# Patient Record
Sex: MALE | Race: BLACK OR AFRICAN AMERICAN | Employment: UNEMPLOYED | URBAN - METROPOLITAN AREA
[De-identification: names, ages, dates, MRNs, and addresses within clinical notes are randomized per-mention and may not be internally consistent; named-entity substitution may affect disease eponyms.]

---

## 2021-05-11 ENCOUNTER — HOSPITAL ENCOUNTER (EMERGENCY)
Age: 41
Discharge: HOME | End: 2021-05-11
Attending: EMERGENCY MEDICINE
Payer: COMMERCIAL

## 2021-05-11 VITALS
SYSTOLIC BLOOD PRESSURE: 158 MMHG | BODY MASS INDEX: 33.67 KG/M2 | HEART RATE: 85 BPM | TEMPERATURE: 98.6 F | RESPIRATION RATE: 14 BRPM | WEIGHT: 262.35 LBS | DIASTOLIC BLOOD PRESSURE: 92 MMHG | OXYGEN SATURATION: 96 % | HEIGHT: 74 IN

## 2021-05-11 DIAGNOSIS — B37.0 CANDIDIASIS OF MOUTH: ICD-10-CM

## 2021-05-11 DIAGNOSIS — K14.0 GLOSSITIS: Primary | ICD-10-CM

## 2021-05-11 PROCEDURE — 99283 EMERGENCY DEPT VISIT LOW MDM: CPT

## 2021-05-11 ASSESSMENT — ENCOUNTER SYMPTOMS
WHEEZING: 0
EYE PAIN: 0
COUGH: 0
EYE DISCHARGE: 0
DIARRHEA: 0
BACK PAIN: 0
NAUSEA: 0
EYE REDNESS: 0
SORE THROAT: 0
ABDOMINAL PAIN: 0
RHINORRHEA: 0
VOMITING: 0
SHORTNESS OF BREATH: 0

## 2021-05-11 NOTE — ED PROVIDER NOTES
organization: None     Attends meetings of clubs or organizations: None     Relationship status: None    Intimate partner violence     Fear of current or ex partner: None     Emotionally abused: None     Physically abused: None     Forced sexual activity: None   Other Topics Concern    None   Social History Narrative    None       SCREENINGS             PHYSICAL EXAM    (up to 7 for level 4, 8 or more for level 5)     ED Triage Vitals [05/11/21 0817]   BP Temp Temp Source Pulse Resp SpO2 Height Weight   (!) 158/92 98.6 °F (37 °C) Oral 85 14 96 % 6' 2\" (1.88 m) 262 lb 5.6 oz (119 kg)      height is 6' 2\" (1.88 m) and weight is 262 lb 5.6 oz (119 kg). His oral temperature is 98.6 °F (37 °C). His blood pressure is 158/92 (abnormal) and his pulse is 85. His respiration is 14 and oxygen saturation is 96%. Physical Exam  Vitals signs and nursing note reviewed. Constitutional:       Appearance: He is well-developed. He is not diaphoretic. HENT:      Head: Normocephalic and atraumatic. Right Ear: External ear normal.      Left Ear: External ear normal.      Mouth/Throat:      Lips: Pink. No lesions. Mouth: Mucous membranes are moist. No injury or oral lesions. Tongue: Lesions present. Tongue does not deviate from midline. Comments: Patient does have some streaky whitish exudate along the lateral aspects of the tongue. Some of the posterior papillae are a little bit hypertrophied. No masses palpated. No trismus. No lesions on the buccal mucosa that I can appreciate. Eyes:      General: No scleral icterus. Right eye: No discharge. Left eye: No discharge. Conjunctiva/sclera: Conjunctivae normal.   Neck:      Musculoskeletal: Normal range of motion. Trachea: No tracheal deviation. Pulmonary:      Effort: Pulmonary effort is normal. No respiratory distress. Breath sounds: No stridor. Musculoskeletal: Normal range of motion.    Skin:     General: Skin is warm and dry. Neurological:      Mental Status: He is alert and oriented to person, place, and time. Coordination: Coordination normal.   Psychiatric:         Behavior: Behavior normal.             DIAGNOSTIC RESULTS   LABS:    No results found for this visit on 05/11/21. All other labs were within normal range or not returned as of this dictation. EKG: All EKG's are interpreted by the Emergency Department Physician who either signs orCo-signs this chart in the absence of a cardiologist.    None    RADIOLOGY:   Non-plain film images such as CT, Ultrasound and MRI are read by the radiologist. Plain radiographic images are visualized and preliminarily interpreted by the  EDProvider with the below findings:    None        PROCEDURES   Unless otherwise noted below, none     Procedures    CRITICAL CARE TIME   N/A    CONSULTS:  None    EMERGENCY DEPARTMENT COURSE and DIFFERENTIAL DIAGNOSIS/MDM:   Vitals:    Vitals:    05/11/21 0817   BP: (!) 158/92   Pulse: 85   Resp: 14   Temp: 98.6 °F (37 °C)   TempSrc: Oral   SpO2: 96%   Weight: 262 lb 5.6 oz (119 kg)   Height: 6' 2\" (1.88 m)       Patient was given the following medications:  Medications - No data to display    Likely just some glossitis secondary to candidiasis. Will empirically treat with nystatin. If he does not improve I am going to refer him to ear nose and throat. FINAL IMPRESSION      1. Glossitis    2.  Candidiasis of mouth          DISPOSITION/PLAN    DISPOSITION Decision To Discharge 05/11/2021 08:28:47 AM      PATIENT REFERRED TO:  [address] [place] [address]  [address] [place] [address]  [phone]      This is our ear nose and throat specialist      DISCHARGE MEDICATIONS:  New Prescriptions    NYSTATIN (MYCOSTATIN) 164372 UNIT/ML SUSPENSION    Take 5 mLs by mouth 4 times daily for 24 days       DISCONTINUED MEDICATIONS:  Discontinued Medications    No medications on file              (Please note that portions of this note were completed with a voice recognition program.  Efforts were made to editthe dictations but occasionally words are mis-transcribed.)    Robson Webb MD (electronically signed)            Robson Webb MD  05/11/21 6428

## 2021-05-11 NOTE — ED NOTES
Awake alert  resp easy and unlabored  Skin warm and dry Aware how and why to take meds  To follow up with pmd  To have bp rechecked     Fern Howell RN  05/11/21 8811

## 2021-08-25 ENCOUNTER — OFFICE VISIT (OUTPATIENT)
Dept: ENT CLINIC | Age: 41
End: 2021-08-25
Payer: OTHER GOVERNMENT

## 2021-08-25 VITALS — BODY MASS INDEX: 33 KG/M2 | SYSTOLIC BLOOD PRESSURE: 122 MMHG | DIASTOLIC BLOOD PRESSURE: 82 MMHG | WEIGHT: 257 LBS

## 2021-08-25 DIAGNOSIS — H91.93 BILATERAL HEARING LOSS, UNSPECIFIED HEARING LOSS TYPE: ICD-10-CM

## 2021-08-25 DIAGNOSIS — K14.8 TONGUE LESION: ICD-10-CM

## 2021-08-25 DIAGNOSIS — H93.8X3 SENSATION OF FULLNESS IN BOTH EARS: Primary | ICD-10-CM

## 2021-08-25 DIAGNOSIS — D10.5 PAPILLOMA OF OROPHARYNX: ICD-10-CM

## 2021-08-25 DIAGNOSIS — H61.23 BILATERAL IMPACTED CERUMEN: ICD-10-CM

## 2021-08-25 PROCEDURE — G8417 CALC BMI ABV UP PARAM F/U: HCPCS | Performed by: STUDENT IN AN ORGANIZED HEALTH CARE EDUCATION/TRAINING PROGRAM

## 2021-08-25 PROCEDURE — 99203 OFFICE O/P NEW LOW 30 MIN: CPT | Performed by: STUDENT IN AN ORGANIZED HEALTH CARE EDUCATION/TRAINING PROGRAM

## 2021-08-25 PROCEDURE — G8427 DOCREV CUR MEDS BY ELIG CLIN: HCPCS | Performed by: STUDENT IN AN ORGANIZED HEALTH CARE EDUCATION/TRAINING PROGRAM

## 2021-08-25 PROCEDURE — 69210 REMOVE IMPACTED EAR WAX UNI: CPT | Performed by: STUDENT IN AN ORGANIZED HEALTH CARE EDUCATION/TRAINING PROGRAM

## 2021-08-25 NOTE — PROGRESS NOTES
EXAM    GENERAL: No acute distress, alert and oriented, no hoarseness, strong voice  EYES: EOMI, Anti-icteric  HENT:   Head: Normocephalic and atraumatic. Face:  Symmetric, facial nerve intact, no sinus tenderness  Right Ear: Normal external ear, normal external auditory canal, intact tympanic membrane with normal mobility and aerated middle ear  Left Ear: Normal external ear, normal external auditory canal, intact tympanic membrane with normal mobility and aerated middle ear  Mouth/Oral Cavity:  normal lips, Uvula is midline, no mucosal lesions, no trismus, fair dentition, normal salivary quality/flow  Oropharynx/Larynx:  normal oropharynx, 1+ tonsils; patient did not tolerate mirror exam due to excessive gag reflex  Nose:Normal external nasal appearance. Anterior rhinoscopy shows  a deviated septum preventing view posteriorly. Hypertrophy of turbinates. Normal mucosa   NECK: Normal range of motion, no thyromegaly, trachea is midline, no lymphadenopathy, no neck masses, no crepitus  CHEST: Normal respiratory effort, no retractions, breathing comfortably  SKIN: No rashes, normal appearing skin, no evidence of skin lesions/tumors  Neuro:  cranial nerve II-XII intact; normal gait  Cardio:  no edema    There is a small 5 mm papilloma lateral to the uvula on the right side. There is no underlying ulceration or mass. PROCEDURE    Use of Operating Microscope and Cerumen Removal CPT code 48246-46  Indications:  Bilateral cerumen impaction obstructing visualization of the tympanic membrane(s). An operating microscope was utilized to visualize the external auditory canals using a speculum. The external auditory canals were occluded with cerumen bilaterally. The cerumen and debris was removed with instrumentation including suction and currettes under microscopic evaluation. The bilateral tympanic membrane(s) and ossicles are intact. No fluid was visualized in the bilateral middle ears.               This note was generated completely or in part utilizing Dragon dictation speech recognition software. Occasionally, words are mistranscribed and despite editing, the text may contain inaccuracies due to incorrect word recognition. If further clarification is needed please contact the office at [phone]. An electronic signature was used to authenticate this note.     --Yue Stockton MD

## 2022-02-08 ENCOUNTER — HOSPITAL ENCOUNTER (OUTPATIENT)
Dept: PHYSICAL THERAPY | Age: 42
Setting detail: THERAPIES SERIES
Discharge: HOME | End: 2022-02-08
Payer: OTHER GOVERNMENT

## 2022-02-08 PROCEDURE — 97162 PT EVAL MOD COMPLEX 30 MIN: CPT

## 2022-02-08 PROCEDURE — 97530 THERAPEUTIC ACTIVITIES: CPT

## 2022-02-08 NOTE — PROGRESS NOTES
Physical Therapy      [address] [place] [address] and Sports Rehabilitation, [facility]  [address] [facility]  Phone: [phone]   Fax:     [phone]    Please sign and fax to [phone]    Physical Therapy Prescription    Date: 2022    Patient Name: Shay Reyes  : 1980  MRN: [id number]    Diagnosis:Diagnosis: M54.2 (ICD-10-CM) - Neck pain  Treatment Diagnosis: Treatment Diagnosis: Decreased functional mobility 2/2 neck pain. Referring Physician: Referring Practitioner  Toni Dickinson MD    Drug Allergies:  NA    With your approval we would like to add the following to the patient's current PT treatment:    []  Knee high compression stockings 20-30 mm Hg for bilateral swelling      Electronically signed by:  Bren Caal, PT   1217    If you have any questions or concerns, please don't hesitate to call.   Thank you for your referral.    Physician Signature:________________________________Date:__________________  By signing above, therapists plan is approved by physician

## 2022-02-08 NOTE — PLAN OF CARE
[facility] Outpatient [facility] and Therapy,  [facility]  [address] [facility], [facility] [place]  Phone: [phone]   Fax:     [phone]          Physical Therapy Certification    Dear Referring Practitioner: Nichole Tolbert,    We had the pleasure of evaluating the following patient for physical therapy services at [facility] and Therapy. A summary of our findings can be found in the initial assessment below. This includes our plan of care. If you have any questions or concerns regarding these findings, please do not hesitate to contact me at the office phone number checked above. Thank you for the referral.       Physician Signature:_______________________________Date:__________________  By signing above (or electronic signature), therapists plan is approved by physician            Patient: Mary Bhatia   : 1980   MRN: [id number]     Referring Physician: Referring Practitioner: Nichole Tolbert      Evaluation Date: 2022        Medical Diagnosis Information:  Diagnosis: M54.2 (ICD-10-CM) - Neck pain   Treatment Diagnosis: Decreased functional mobility 2/2 neck pain.                                          Insurance information: PT Insurance Information: [facility]    Precautions/ Contra-indications: none noted  Latex Allergy:  [x]NO      []YES  Preferred Language for Healthcare:   [x]English       []other:    C-SSRS Triggered by Intake questionnaire (Past 2 wk assessment ):   [x] No, Questionnaire did not trigger screening.   [] Yes, Patient intake triggered C-SSRS Screening      [] C-SSRS Screening completed  [] PCP notified via Epic          History of Present Condition   21     POSTERIOR CERVICAL LAMINIOPLASTY C4-5-6-7 ; ANTERIOR CERVICAL DISCECTOMY FUSION C4-5, C5-6 , C6-7 ASPIRATE BONE MARROW RIGHT ILIAC CREST AND C6 CORPECTOMY            MD note 22  The patient is now approximately six to seven months post anterior-posterior fusion. His sequential scans show marked improvement in the cervical stenosis, cord compression and swelling. The swelling in his cord is improving. His subarachnoid space is now circumferentially present. I believe that he has had a good decompression of his cord. However, his neurological symptoms in his upper and lower extremities persists and are simply incapacitating. He has diffuse weakness in his arms.                 His reflexes are hyperactive. His toes are down going. He has diffuse weakness in his upper and lower extremities.            SUBJECTIVE: Pt reports weakness in his arms at times with NT, also bilateral hands. Notes weakness in both legs R>L    Relevant Medical History:Additional Pertinent Hx: HTN, back surgery 2014/2015  Functional Disability Index:  NDI   21    Pain Scale: 7-9 /10  Tends to be bad in the morning, better when it loosens up. Easing factors:  Rest.  Provocative factors:  Initial AM/ too much activity    Type: []Constant   []Intermittent  []Radiating []Localized []other:     Numbness/Tingling: through UE's    Occupation/School:  Currently unable to work    Living Status/Prior Level of Function: lives with family. [address] home with railing. Indep with self care. Difficulty putting on pants on right leg, difficulty with sock and shoe on right side as well. Difficulty descending steps. Sleeps in recliner 2/2 neck and back issues. OBJECTIVE:   Palpation: no specific TTP noted. Has mild pitting edema moreso on right than left. Functional Mobility/Transfers: Mod I - needs arms of chair for leverage    Posture:  Fwd head, slightly slouched posture    Bandages/Dressings/Incisions: NA    Gait: (include devices/WB status):  WNL    CERV ROM     Cervical Flexion Chin to chest    Cervical Extension neutral     Left Right   Cervical SB     Cervical rotation     Quadrant     Dorsal Glide      UE ROM Left Right   Shoulder Flex wfl wfl   Shoulder Abd wfl wfl   Shoulder ER wfl wfl   Shoulder IR wfl wfl   Elbow flex/ext wfl wfl   Wrist flex/ext/pro/sup wfl wfl   Finger flex/ext/opposition wfl wfl   Shoulder AROM WNL w OP     UE Strength  Left Right   Shoulder Flex Grossly 4-/5 throughout Grossly 4-/5throughout   Shoulder Scap     Shoulder ABd (C5 Axillary)     Shoulder ER      Shoulder IR     Elbow Flex (C5 Musc)     Elbow Ext (C7 Radial)     Wrist Flex (C6 Radial)     Wrist Ext (C7 Radial)     Finger flex (C8 median)     Finger ext (C7 Radial-PIN)     APB (T1 Median)     Finger Abd (T1 Ulnar)     UE myotomes WNL            LE ROM -  WFL mario LE's, increased effort to initiate movement RLE  LE strength - grossly 4/5 left,  4-/5 right. Balance -  Feet together - wfl ,  Modified tandem min sway. Tandem  R/L increased sway with R/L  - unable to SLS      Reflexes Normal Abnormal Comments               S1-2 Seated achilles [] [x] hyper   S1-2 Prone knee bend [] []    L3-4 Patellar tendon [] [x] hyper   C5-6 Biceps [] [x] hyper   C6 Brachioradialis [] []    C7-8 Triceps [] [x] hyper     Reflexes/Sensation:    [x]Dermatomes/Myotomes intact    []Reflexes equal and normal bilaterally   []Other:    Joint mobility: NT   []Normal    []Hypo   []Hyper    Neurodynamics:     Orthopedic Special Tests:      Cluster Testing  Normal Abnormal N/A Comments   Babinski Test [] [] []    Clinton Test [] [] []    Inverted Sup Sign [] [] []    Alar Ligament Test [] [] []    Transverse Ligament Test [] [] []    Sharp-Lakeshia Test [] [] []    Hautards Test [] [] []    Vertebral Artery Test [] [] []                                    [x] Patient history, allergies, meds reviewed. Medical chart reviewed. See intake form. Review Of Systems (ROS):  [x]Performed Review of systems (Integumentary, CardioPulmonary, Neurological) by intake and observation. Intake form has been scanned into medical record.  Patient has been instructed to contact their primary care physician regarding ROS issues if not already being addressed at this time. Co-morbidities/Complexities (which will affect course of rehabilitation):  []None     2       Arthritic conditions   []Rheumatoid arthritis (M05.9)  []Osteoarthritis (M19.91)   Cardiovascular conditions   [x]Hypertension (I10)  []Hyperlipidemia (E78.5)  []Angina pectoris (I20)  []Atherosclerosis (I70)  []CVA Musculoskeletal conditions   []Disc pathology   []Congenital spine pathologies   [x]Prior surgical intervention  []Osteoporosis (M81.8)  []Osteopenia (M85.8)   Endocrine conditions   []Hypothyroid (E03.9)  []Hyperthyroid Gastrointestinal conditions   []Constipation (U49.01)   Metabolic conditions   []Morbid obesity (E66.01)  []Diabetes type 1(E10.65) or 2 (E11.65)   []Neuropathy (G60.9)     Pulmonary conditions   []Asthma (J45)  []Coughing   []COPD (J44.9)   Psychological Disorders  []Anxiety (F41.9)  []Depression (F32.9)   []Other:   []Other:          Barriers to/and or personal factors that will affect rehab potential:              []Age  []Sex   []Smoker              []Motivation/Lack of Motivation                        []Co-Morbidities              []Cognitive Function, education/learning barriers              []Environmental, home barriers              []profession/work barriers  []past PT/medical experience  []other:  Justification:     Falls Risk Assessment (30 days):   [x] Falls Risk assessed and no intervention required. [] Falls Risk assessed and Patient requires intervention due to being higher risk   TUG score (>12s at risk):     [] Falls education provided, including       ASSESSMENT Pt with history of UE/LE weakness 2/2 cord compression. Will benefit from aquatic therapy for strengthening, balance and functional mobility.       Functional Impairments:     []Noted cervical/thoracic/GHJ joint hypomobility   []Noted cervical/thoracic/GHJ joint hypermobility   []Decreased cervical/UE functional ROM   []Noted Headache pain aggravated by neck movements with/without dizziness   []Abnormal reflexes/sensation/myotomal/dermatomal deficits   []Decreased DCF control or ability to hold head up   []Decreased RC/scapular/core strength and neuromuscular control    []Decreased UE functional strength   []other:      Functional Activity Limitations (from functional questionnaire and intake)   []Reduced ability to tolerate prolonged functional positions   [x]Reduced ability or difficulty with changes of positions or transfers between positions   [x]Reduced ability to maintain good posture and demonstrate good body mechanics with sitting, bending, and lifting   [] Reduced ability or tolerance with driving and/or computer work   [x]Reduced ability to perform lifting, reaching, carrying tasks   []Reduced ability to concentrate   []Reduced ability to sleep    [x]Reduced ability to tolerate any impact through UE or spine   [x]Reduced ability to ambulate prolonged functional periods/distances   []other:    Participation Restrictions   []Reduced participation in self care activities   [x]Reduced participation in home management activities   [x]Reduced participation in work activities   [x]Reduced participation in social activities. [x]Reduced participation in sport/recreational activities.     Classification/Subgrouping:   []signs/symptoms consistent with neck pain with mobility deficits     []signs/symptoms consistent with neck pain with movement coordinated impairments    []signs/symptoms consistent with neck pain with radiating pain    []signs/symptoms consistent with neck pain with headaches (cervicogenic)    []Signs/symptoms consistent with nerve root involvement including myotome & dermatome dysfunction   []sign/symptoms consistent with facet dysfunction of cervical and thoracic spine    []signs/symptoms consistent suggesting central cord compression/UMN syndromes   []signs/symptoms consistent with discogenic cervical pain   []signs/symptoms consistent with rib dysfunction   []signs/symptoms consistent with postural dysfunction   []signs/symptoms consistent with shoulder pathology    [x]signs/symptoms consistent with post-surgical status including decreased ROM, strength and function. []signs/symptoms consistent with pathology which may benefit from Dry Needling   []signs/symptoms which may limit the use of advanced manual therapy techniques: (Elevated CV risk profile, recent trauma, intolerance to end range positions, prior TIA, visual issues, UE neurological compromise )     Prognosis/Rehab Potential:      []Excellent   [x]Good    []Fair   []Poor    Tolerance of evaluation/treatment:    []Excellent   [x]Good    []Fair   []Poor    Physical Therapy Evaluation Complexity Justification  [x] A history of present problem with:  [] no personal factors and/or comorbidities that impact the plan of care;  [x]1-2 personal factors and/or comorbidities that impact the plan of care  []3 personal factors and/or comorbidities that impact the plan of care  [x] An examination of body systems using standardized tests and measures addressing any of the following: body structures and functions (impairments), activity limitations, and/or participation restrictions;:  [] a total of 1-2 or more elements   [x] a total of 3 or more elements   [] a total of 4 or more elements   [x] A clinical presentation with:  [x] stable and/or uncomplicated characteristics   [] evolving clinical presentation with changing characteristics  [] unstable and unpredictable characteristics;   [x] Clinical decision making of [] low, [x] moderate, [] high complexity using standardized patient assessment instrument and/or measurable assessment of functional outcome.     [] EVAL (LOW) 71369 (typically 20 minutes face-to-face)  [x] EVAL (MOD) 15911 (typically 30 minutes face-to-face)  [] EVAL (HIGH) 62728 (typically 45 minutes face-to-face)  [] RE-EVAL     PLAN:   Frequency/Duration:  2 days per week for 12 Weeks:  Interventions:  [x]  Therapeutic exercise including: strength training, ROM, for cervical spine,scapula, core and Upper extremity, including postural re-education. [x]  NMR activation and proprioception for Deep cervical flexors, periscapular and RC muscles and Core, including postural re-education. [x]  Manual therapy as indicated for C/T spine, ribs, Soft tissue to include: Dry Needling/IASTM, STM, PROM, Gr I-IV mobilizations, manipulation. [x] Modalities as needed that may include: thermal agents, E-stim, Biofeedback, US, iontophoresis as indicated  [x] Patient education on joint protection, postural re-education, activity modification, progression of HEP. [x] Aquatic exercise including: strength training, ROM, for cervical spine,scapula, core and Upper extremity, including postural re-education. GOALS:  Patient stated goal: TO be able to move better and not have as much pain  [] Progressing: [] Met: [] Not Met: [] Adjusted    Therapist goals for Patient:   Short Term Goals: To be achieved in: 2 weeks  1. Independent in HEP and progression per patient tolerance, in order to prevent re-injury. [] Progressing: [] Met: [] Not Met: [] Adjusted  2. Patient will have a decrease in pain to facilitate improvement in movement, function, and ADLs as indicated by Functional Deficits. [] Progressing: [] Met: [] Not Met: [] Adjusted    Long Term Goals: To be achieved in: 12 weeks  1. Disability index score of 30% or less for the NDI to assist with reaching prior level of function. [] Progressing: [] Met: [] Not Met: [] Adjusted  2. Patient will demonstrate increased AROM to [facility] of cervical/thoracic spine to allow for proper joint functioning as indicated by patients Functional Deficits. [] Progressing: [] Met: [] Not Met: [] Adjusted  3.  Patient will demonstrate an increase in postural awareness and control and activation of  Deep cervical stabilizers to allow for proper functional mobility as indicated by patients Functional Deficits. [] Progressing: [] Met: [] Not Met: [] Adjusted  4. Patient will return to 70% functional activities without increased symptoms or restriction. [] Progressing: [] Met: [] Not Met: [] Adjusted  5. To be able to walk better with better balance. [] Progressing: [] Met: [] Not Met: [] Adjusted         Electronically signed by:  Juan M Zimmerman, PT [id number]      Note: If patient does not return for scheduled/recommended follow up visits, this note will serve as a discharge from care along with the most recent update on progress.

## 2022-02-08 NOTE — FLOWSHEET NOTE
[address], [place]  [address] [facility]  Phone: [phone]   Fax:     [phone]      Physical Therapy Daily/Aquatic Flow Sheet   Date:  2022    Patient Name:  Chilo Marcelo    :  1980  MRN: [id number]    Restrictions/Precautions:   None noted  Pertinent Medical History:Additional Pertinent Hx: HTN, back surgery     Medical/Treatment Diagnosis Information:   · Diagnosis: M54.2 (ICD-10-CM) - Neck pain  · Treatment Diagnosis: Decreased functional mobility 2/2 neck pain. Insurance/Certification information:  PT Insurance Information: Kunal  Physician Information:  Referring Practitioner: Poncho Orellana of [facility] signed (Y/N): sent    Date of Patient follow up with Physician:      Progress Report: []  Yes  [x]  No     Date Range for reporting period:  Beginnin2022  Ending:      Progress report due (10 Rx/or 30 days whichever is less): 3/8     Recertification due (POC duration/ or 90 days whichever is less):      Visit # POC/Insurance Allowable Auth Needed   1 30 []Yes   [x]No     Latex Allergy:  [x]NO      []YES  Preferred Language for Healthcare:   [x]English       []Other:    History of Present Condition   21     POSTERIOR CERVICAL LAMINIOPLASTY C4-5-6-7 ; ANTERIOR CERVICAL DISCECTOMY FUSION C4-5, C5-6 , C6-7 ASPIRATE BONE MARROW RIGHT ILIAC CREST AND C6 CORPECTOMY            MD note 22  The patient is now approximately six to seven months post anterior-posterior fusion. His sequential scans show marked improvement in the cervical stenosis, cord compression and swelling. The swelling in his cord is improving. His subarachnoid space is now circumferentially present.           SUBJECTIVE: Pt reports weakness in his arms at times with NT, also bilateral hands.   Notes weakness in both legs R>L     Relevant Medical History:Additional Pertinent Hx: HTN, back surgery   Functional Disability Index: NDI   21     Pain Scale: 7-9 /10  Tends to be bad in the morning, better when it loosens up. Easing factors:  Rest.  Provocative factors:  Initial AM/ too much activity     Type: [x]? Constant       []? Intermittent  []? Radiating     []? Localized     []? other:                Numbness/Tingling: through UE's     Occupation/School:  Currently unable to work     Living Status/Prior Level of Function: lives with family. [address] home with railing. Indep with self care. Difficulty putting on pants on right leg, difficulty with sock and shoe on right side as well. Difficulty descending steps. Sleeps in recliner 2/2 neck and back issues.      SUBJECTIVE: Pt reports weakness in his arms at times with NT, also bilateral hands. Notes weakness in both legs R>L      OBJECTIVE:   Palpation: no specific TTP noted. Has mild pitting edema moreso on right than left.     Functional Mobility/Transfers: Mod I - needs arms of chair for leverage    UE -  ROM  WFL  Strength  Grossly 4-/5 throughout     LE ROM -  WFL mario LE's, increased effort to initiate movement RLE  LE strength - grossly 4/5 left,  4-/5 right.     Balance -  Feet together - wfl ,  Modified tandem min sway.   Tandem  R/L increased sway with R/L  - unable         Land-based Visits Exercises/Activities:  Therapeutic Ex (04359)  Min: Resistance/Repetitions Notes   Tandem stance               Therapeutic Activity (97963) Min: 25     Pool tour, policies and procedures Pt demonstrates understanding    Review of home set up/modifications     Review of LE swelling and options to manage    REview of gait issues with balance and options to manage     NMR re-education (65977) Min:                          Manual Intervention (07095)  Min:                    Modalities  Min:               Aquatic Visits Exercises/Activities:  Aquatic Abbreviation Key  B= Belt DB= Dumbells T= Theratube   G=Gloves N= Noodles W= Weights   P= Paddles WW=Water Walker K= Kickboard        Transfers:         % Immersion:             Ambulation:   Exercises UE:      Forwards  Shoulder Shrugs     Lateral  Shoulder circles     Marching    Scapular Retraction      Retro   Rolling      Cariocas  Push Downs    Multifidus walkouts w/paddle  IR/ER      Punching    Stretching:  Rowing    Gastroc/Soleus   Elbow Flex/Ext    Hamstring   Shldr Flex/Ext     Knee flex stretch   Shldr Abd/Add    Piriformis   Horiz Abd/Add     Hip flexor    Arm Circles     SKTC   PNF Diagonals    DKTC  UE oscillations f/b, s/s    ITB   Wall Push-ups    Quad  Figure 8's    Mid back   Buoy pull/push downs    UT  Tband rows    Rhom  Tband lats    Post Shoulder  Lumbar Rot w/ paddles    Pec   Small ball pull downs                   diagonals             Cervical:       AROM Flex       AROM Extension     LEs exercises:   AROM Side Bending    Marching   AROM Rotation    Heel Raises   Chin tucks    Toe Raises   Chin nods     Squats        Hamstring Curls        Hip Flexion   Balance:      Hip Abduction  SLS    Hip Circles  Tandem stance    TA set  NBOS eyes open    Glut Set  NBOS eyes closed    Hip Extension  Hand to Opposite Knee    Hip Adduction    Box Step     Hip IR   Noodle Stance     Hip ER  Stop/Go Gait    Fig 8's  Switch Gait                Seated:  Functional:    Ankle Pumps  Step up forward    Ankle circles  Step up lateral    Knee flex & ext  Step down    Hip Abd & Adduction  [place] squats    Bicycle   Crate Lifts    Add Set with ball  Lunges forward    LX stab with med ball throws  Lunges lateral    Ankle INV  Lunges retro    Ankle EV  Lower ab curl with noodle      Upper ab curl with ball      Med ball straight lifts      Med ball diagonal lifts      Hydrorider          Noodle:      Leg Press  Deep Water:    Noodle hang at wall  Jog    Noodle hang deep water  Jumping Jacks    Noodle Bicycle  Heel to toe      Hand to opposite knee      Cross country skier      Rocking Horse      Other Therapeutic Activities:  Pt was educated on PT POC, Diagnosis, Prognosis, pathomechanics as well as frequency and duration of scheduling future physical therapy appointments. Time was also taken on this day to answer all patient questions and participation in PT. Reviewed appointment policy in detail with patient and patient verbalized understanding. Home Exercise Program:  Patient was instructed in the following for HEP:     . Patient verbalized/demonstrated understanding and was issued written handout. Charges: Therapeutic Exercise and NMR EXR  [] (80162) Provided verbal/tactile cueing for activities related to strengthening, flexibility, endurance, ROM for improvements in LE, proximal hip, and core control with self care, mobility, lifting, ambulation.  [] (12836) Provided verbal/tactile cueing for activities related to improving balance, coordination, kinesthetic sense, posture, motor skill, proprioception  to assist with LE, proximal hip, and core control in self care, mobility, lifting, ambulation and eccentric single leg control.      NMR and Therapeutic Activities:    [] (23711 or 80595) Provided verbal/tactile cueing for activities related to improving balance, coordination, kinesthetic sense, posture, motor skill, proprioception and motor activation to allow for proper function of core, proximal hip and LE with self care and ADLs and functional mobility.   [] (24491) Gait Re-education- Provided training and instruction to the patient for proper LE, core and proximal hip recruitment and positioning and eccentric body weight control with ambulation re-education including up and down stairs     Home Exercise Program:    [x] (22916) Reviewed/Progressed HEP activities related to strengthening, flexibility, endurance, ROM of core, proximal hip and LE for functional self-care, mobility, lifting and ambulation/stair navigation   [] (28149)Reviewed/Progressed HEP activities related to improving balance, coordination, kinesthetic sense, posture, motor skill, proprioception of core, proximal hip and LE for self care, mobility, lifting, and ambulation/stair navigation      Manual Treatments:  PROM / STM / Oscillations-Mobs:  G-I, II, III, IV (PA's, Inf., Post.)  [] (77721) Provided manual therapy to mobilize LE, proximal hip and/or LS spine soft tissue/joints for the purpose of modulating pain, promoting relaxation,  increasing ROM, reducing/eliminating soft tissue swelling/inflammation/restriction, improving soft tissue extensibility and allowing for proper ROM for normal function with self care, mobility, lifting and ambulation.      Individual Aquatic Therapy:  [] (73207) Provided verbal and tactile cueing for strengthening, flexibility, ROM using the therapeutic properties of water (buoyancy, resistance) for improvements in core control, mobility and ambulation     Aquatic Group:  [] (67599) Provided intermittent verbal and tactile cueing for strengthening, endurance, flexibility and ROM for 2-4 individuals that do not require one-on one patient contact by the therapist       Land Timed Code Treatment Minutes: 25   Land Total Treatment Minutes: 40     Individual Aquatic Minutes:    Aquatic Group Minutes:      [] EVAL (LOW) 40666 (typically 20 minutes face-to-face)  [x] EVAL (MOD) 02786 (typically 30 minutes face-to-face)  [] EVAL (HIGH) 69579 (typically 45 minutes face-to-face)  [] RE-EVAL     [] HX(56587) x     [] Dry needle 1 or 2 Muscles (38172)  [] NMR (88606) x     [] Dry needle 3+ Muscles (65970)  [] Manual (68821) x     [] Ultrasound (39673) x  [x] TA (78086) x    2 [] Mech Traction (87459)  [] ES(attended) (99175)     [] ES (un) (94936):   [] Vasopump (16114) [] Ionto (34582)   [] Aquatic Ind (15067) x    [] Aquatic Group (36463) x   [] Other:    Treatment/Activity Tolerance:  [x] Patient tolerated treatment well [] Patient limited by fatigue  [] Patient limited by pain  [] Patient limited by other medical complications  [] Other:     Prognosis: [] Good [] Fair  [] Poor     ASSESSMENT Pt with history of UE/LE weakness 2/2 cord compression. Will benefit from aquatic therapy       GOALS:  Patient stated goal: TO be able to move better and not have as much pain  []? Progressing: []? Met: []? Not Met: []? Adjusted     Therapist goals for Patient:   Short Term Goals: To be achieved in: 2 weeks  1. Independent in HEP and progression per patient tolerance, in order to prevent re-injury. []? Progressing: []? Met: []? Not Met: []? Adjusted  2. Patient will have a decrease in pain to facilitate improvement in movement, function, and ADLs as indicated by Functional Deficits. []? Progressing: []? Met: []? Not Met: []? Adjusted     Long Term Goals: To be achieved in: 12 weeks  1. Disability index score of 30% or less for the NDI to assist with reaching prior level of function. []? Progressing: []? Met: []? Not Met: []? Adjusted  2. Patient will demonstrate increased AROM to [facility] of cervical/thoracic spine to allow for proper joint functioning as indicated by patients Functional Deficits. []? Progressing: []? Met: []? Not Met: []? Adjusted  3. Patient will demonstrate an increase in postural awareness and control and activation of  Deep cervical stabilizers to allow for proper functional mobility as indicated by patients Functional Deficits. []? Progressing: []? Met: []? Not Met: []? Adjusted  4. Patient will return to 70% functional activities without increased symptoms or restriction. []? Progressing: []? Met: []? Not Met: []? Adjusted  5. To be able to walk better with better balance. []? Progressing: []? Met: []? Not Met: []?  Adjusted                 Patient Requires Follow-up: [x] Yes  [] No    Plan:   [x] Continue per plan of care [] Alter current plan (see comments)  [] Plan of care initiated [] Hold pending MD visit [] Discharge    Plan for Next Session:   Initiate aquatic therapy    Electronically signed by:  Niesha Joshua, PT [id number]    Note: If patient does not return for scheduled/recommended follow up visits, this note will serve as a discharge from care along with the most recent update on progress.

## 2022-02-10 ENCOUNTER — HOSPITAL ENCOUNTER (OUTPATIENT)
Dept: PHYSICAL THERAPY | Age: 42
Setting detail: THERAPIES SERIES
Discharge: HOME | End: 2022-02-10
Payer: OTHER GOVERNMENT

## 2022-02-10 PROCEDURE — 97113 AQUATIC THERAPY/EXERCISES: CPT

## 2022-02-10 NOTE — FLOWSHEET NOTE
[address] [facility],[address], [facility]  [address] [facility] [place], [facility]  Phone: [phone]   Fax:     [phone]      Physical Therapy Daily/Aquatic Flow Sheet   Date:  2/10/2022    Patient Name:  Haleigh Sandoval    :  1980  MRN: [id number]    Restrictions/Precautions:   None noted  Pertinent Medical History:Additional Pertinent Hx: HTN, back surgery     Medical/Treatment Diagnosis Information:   · Diagnosis: M54.2 (ICD-10-CM) - Neck pain  · Treatment Diagnosis: Decreased functional mobility 2/2 neck pain. Insurance/Certification information:  PT Insurance Information: Kunal  Physician Information:  Referring Practitioner: Jordan Gonzalez of care signed (Y/N): sent    Date of Patient follow up with Physician:      Progress Report: []  Yes  [x]  No     Date Range for reporting period:  Beginnin/10/2022  Ending:      Progress report due (10 Rx/or 30 days whichever is less): 3/8     Recertification due (POC duration/ or 90 days whichever is less):      Visit # POC/Insurance Allowable Auth Needed   2 30 []Yes   [x]No     Latex Allergy:  [x]NO      []YES  Preferred Language for Healthcare:   [x]English       []Other:    History of Present Condition   21     POSTERIOR CERVICAL LAMINIOPLASTY C4-5-6-7 ; ANTERIOR CERVICAL DISCECTOMY FUSION C4-5, C5-6 , C6-7 ASPIRATE BONE MARROW RIGHT ILIAC CREST AND C6 CORPECTOMY            MD note 22  The patient is now approximately six to seven months post anterior-posterior fusion. His sequential scans show marked improvement in the cervical stenosis, cord compression and swelling. The swelling in his cord is improving. His subarachnoid space is now circumferentially present.           SUBJECTIVE: Pt reports weakness in his arms at times with NT, also bilateral hands.   Notes weakness in both legs R>L  2/10: My neck pain is 4/10 today.     Relevant Medical History:Additional Pertinent Hx: HTN, back surgery 2014/2015  Functional Disability Index:  NDI   21     Pain Scale: 4/10 neck    Easing factors:  Rest.  Provocative factors:  Initial AM/ too much activity     Type: [x]? Constant       []? Intermittent  []? Radiating     []? Localized     []? other:                Numbness/Tingling: through UE's     Occupation/School:  Currently unable to work     Living Status/Prior Level of Function: lives with family. [address] home with railing. Indep with self care. Difficulty putting on pants on right leg, difficulty with sock and shoe on right side as well. Difficulty descending steps. Sleeps in recliner 2/2 neck and back issues.      SUBJECTIVE: Pt reports weakness in his arms at times with NT, also bilateral hands. Notes weakness in both legs R>L      OBJECTIVE:   Palpation: no specific TTP noted. Has mild pitting edema moreso on right than left.     Functional Mobility/Transfers: Mod I - needs arms of chair for leverage    UE -  ROM  WFL  Strength  Grossly 4-/5 throughout     LE ROM -  WFL mario LE's, increased effort to initiate movement RLE  LE strength - grossly 4/5 left,  4-/5 right.     Balance -  Feet together - wfl ,  Modified tandem min sway.   Tandem  R/L increased sway with R/L  - unable         Land-based Visits Exercises/Activities:  Therapeutic Ex (57231)  Min: Resistance/Repetitions Notes   Tandem stance               Therapeutic Activity (41611) Min: 25     Pool tour, policies and procedures Pt demonstrates understanding    Review of home set up/modifications     Review of LE swelling and options to manage    REview of gait issues with balance and options to manage     NMR re-education (37764) Min:                          Manual Intervention (29898)  Min:                    Modalities  Min:               Aquatic Visits Exercises/Activities:  Aquatic Abbreviation Key  B= Belt DB= Dumbells T= Theratube   G=Gloves N= Noodles W= Weights   P= Paddles WW=Water Walker K= Kickboard        Transfers:   steps with bilat handrails WRIST flex/ext, pron/sup 10/10    % Immersion: 75-80% FINGER flex/ext 10          Ambulation:   Exercises UE:      Forwards 3+1+1 with gentle UE mvt Shoulder Shrugs     Lateral  Shoulder circles 10    Marching    Scapular Retraction  10    Retro   Rolling      Cariocas  Push Downs 10   Multifidus walkouts w/paddle  IR/ER 10     Punching 10   Stretching:  Rowing 10   Gastroc/Soleus   Elbow Flex/Ext 10   Hamstring   Shldr Flex/Ext     Knee flex stretch   Shldr Abd/Add    Piriformis   Horiz Abd/Add     Hip flexor    Arm Circles  10   SKTC   PNF Diagonals    DKTC  UE oscillations f/b, s/s    ITB   Wall Push-ups    Quad  Figure 8's    Mid back   Buoy pull/push downs    UT  Tband rows    Rhom  Tband lats    Post Shoulder  Lumbar Rot w/ paddles    Pec   Small ball pull downs                   diagonals      Small ball pool to deck 10      Cervical:       AROM Flex 5      AROM Extension     LEs exercises:   AROM Side Bending    Marching  10 AROM Rotation 5/5 R/L   Heel Raises  10 Chin tucks    Toe Raises  10 Chin nods     Squats  10      Hamstring Curls  10      Hip Flexion  10 Balance:      Hip Abduction 10 SLS    Hip Circles  Tandem stance    TA set  NBOS eyes open 30 sec without LOB   Glut Set  NBOS eyes closed    Hip Extension  Hand to Opposite Knee    Hip Adduction    Box Step     Hip IR   Noodle Stance     Hip ER  Stop/Go Gait    Fig 8's  Switch Gait      Foot on step 30 sec R/L without LOB         Seated:  Functional:    Ankle Pumps  Step up forward    Ankle circles  Step up lateral    Knee flex & ext  Step down    Hip Abd & Adduction  [place] squats    Bicycle   Crate Lifts    Add Set with ball  Lunges forward    LX stab with med ball throws  Lunges lateral    Ankle INV  Lunges retro    Ankle EV  Lower ab curl with noodle      Upper ab curl with ball      Med ball straight lifts      Med ball diagonal lifts      Hydrorider          Noodle:      Leg Press  Deep Water:    Noodle hang at wall  Jog with ambulation re-education including up and down stairs     Home Exercise Program:    [x] (25744) Reviewed/Progressed HEP activities related to strengthening, flexibility, endurance, ROM of core, proximal hip and LE for functional self-care, mobility, lifting and ambulation/stair navigation   [] (13459)Reviewed/Progressed HEP activities related to improving balance, coordination, kinesthetic sense, posture, motor skill, proprioception of core, proximal hip and LE for self care, mobility, lifting, and ambulation/stair navigation      Manual Treatments:  PROM / STM / Oscillations-Mobs:  G-I, II, III, IV (PA's, Inf., Post.)  [] (67238) Provided manual therapy to mobilize LE, proximal hip and/or LS spine soft tissue/joints for the purpose of modulating pain, promoting relaxation,  increasing ROM, reducing/eliminating soft tissue swelling/inflammation/restriction, improving soft tissue extensibility and allowing for proper ROM for normal function with self care, mobility, lifting and ambulation.      Individual Aquatic Therapy:  [x] (34657) Provided verbal and tactile cueing for strengthening, flexibility, ROM using the therapeutic properties of water (buoyancy, resistance) for improvements in core control, mobility and ambulation     Aquatic Group:  [x] (53723) Provided intermittent verbal and tactile cueing for strengthening, endurance, flexibility and ROM for 2-4 individuals that do not require one-on one patient contact by the therapist       Land Timed Code Treatment Minutes: 25   Land Total Treatment Minutes: [facility] Minutes: 25   Aquatic Group Minutes: 10     [] EVAL (LOW) 74190 (typically 20 minutes face-to-face)  [x] EVAL (MOD) 74283 (typically 30 minutes face-to-face)  [] EVAL (HIGH) 99932 (typically 45 minutes face-to-face)  [] RE-EVAL     [] JM(06517) x     [] Dry needle 1 or 2 Muscles (90530)  [] NMR (60729) x     [] Dry needle 3+ Muscles (54092)  [] Manual (28647) x     [] Ultrasound (88014) x  [x] TA (38768) x    2 [] Mech Traction (17231)  [] ES(attended) (60754)     [] ES (un) (31873):   [] Vasopump (59565) [] Ionto (45578)   [x] Aquatic Ind (78049) x 2   [x] Aquatic Group (38865) x    [] Other:    Treatment/Activity Tolerance:  [x] Patient tolerated treatment well [] Patient limited by fatigue  [] Patient limited by pain  [] Patient limited by other medical complications  [] Other:     Prognosis: [] Good [] Fair  [] Poor     ASSESSMENT Pt with history of UE/LE weakness 2/2 cord compression. Will benefit from aquatic therapy   2/10: Pt completed exercises as instructed. Pt notes dec pain after aquatic therapy. Pt would benefit from skilled instruction for UE exer, cervical ROM, balance and posture awareness to dec pain, inc ROM and improve function. GOALS:  Patient stated goal: TO be able to move better and not have as much pain  []? Progressing: []? Met: []? Not Met: []? Adjusted     Therapist goals for Patient:   Short Term Goals: To be achieved in: 2 weeks  1. Independent in HEP and progression per patient tolerance, in order to prevent re-injury. []? Progressing: []? Met: []? Not Met: []? Adjusted  2. Patient will have a decrease in pain to facilitate improvement in movement, function, and ADLs as indicated by Functional Deficits. []? Progressing: []? Met: []? Not Met: []? Adjusted     Long Term Goals: To be achieved in: 12 weeks  1. Disability index score of 30% or less for the NDI to assist with reaching prior level of function. []? Progressing: []? Met: []? Not Met: []? Adjusted  2. Patient will demonstrate increased AROM to [facility] of cervical/thoracic spine to allow for proper joint functioning as indicated by patients Functional Deficits. []? Progressing: []? Met: []? Not Met: []? Adjusted  3.  Patient will demonstrate an increase in postural awareness and control and activation of  Deep cervical stabilizers to allow for proper functional mobility as indicated by patients Functional Deficits. []? Progressing: []? Met: []? Not Met: []? Adjusted  4. Patient will return to 70% functional activities without increased symptoms or restriction. []? Progressing: []? Met: []? Not Met: []? Adjusted  5. To be able to walk better with better balance. []? Progressing: []? Met: []? Not Met: []? Adjusted                 Patient Requires Follow-up: [x] Yes  [] No    Plan:   [x] Continue per plan of care [] Alter current plan (see comments)  [] Plan of care initiated [] Hold pending MD visit [] Discharge    Plan for Next Session:   Add;  Other UE exercises, cervical ROM as tolerated. Electronically signed by:  Miller Patiño, PTA [id number]  Note: If patient does not return for scheduled/recommended follow up visits, this note will serve as a discharge from care along with the most recent update on progress.

## 2022-02-15 ENCOUNTER — HOSPITAL ENCOUNTER (OUTPATIENT)
Dept: PHYSICAL THERAPY | Age: 42
Setting detail: THERAPIES SERIES
Discharge: HOME | End: 2022-02-15
Payer: OTHER GOVERNMENT

## 2022-02-15 PROCEDURE — 97113 AQUATIC THERAPY/EXERCISES: CPT

## 2022-02-15 NOTE — FLOWSHEET NOTE
[address] [facility],[address], [facility]  [address] [facility] [place], [facility]  Phone: [phone]   Fax:     [phone]      Physical Therapy Daily/Aquatic Flow Sheet   Date:  2/15/2022    Patient Name:  Jl Madden    :  1980  MRN: [id number]    Restrictions/Precautions:   None noted  Pertinent Medical History:Additional Pertinent Hx: HTN, back surgery     Medical/Treatment Diagnosis Information:   · Diagnosis: M54.2 (ICD-10-CM) - Neck pain  · Treatment Diagnosis: Decreased functional mobility 2/2 neck pain. Insurance/Certification information:  PT Insurance Information: Kunal  Physician Information:  Referring Practitioner: Rajani Matt of shay signed (Y/N): sent    Date of Patient follow up with Physician:      Progress Report: []  Yes  [x]  No     Date Range for reporting period:  Beginnin/15/2022  Ending:      Progress report due (10 Rx/or 30 days whichever is less): 3/8     Recertification due (POC duration/ or 90 days whichever is less):      Visit # POC/Insurance Allowable Auth Needed   3 30 []Yes   [x]No     Latex Allergy:  [x]NO      []YES  Preferred Language for Healthcare:   [x]English       []Other:    History of Present Condition   21     POSTERIOR CERVICAL LAMINIOPLASTY C4-5-6-7 ; ANTERIOR CERVICAL DISCECTOMY FUSION C4-5, C5-6 , C6-7 ASPIRATE BONE MARROW RIGHT ILIAC CREST AND C6 CORPECTOMY            MD note 22  The patient is now approximately six to seven months post anterior-posterior fusion. His sequential scans show marked improvement in the cervical stenosis, cord compression and swelling. The swelling in his cord is improving. His subarachnoid space is now circumferentially present.           SUBJECTIVE: Pt reports weakness in his arms at times with NT, also bilateral hands. Notes weakness in both legs R>L  2/10: My neck pain is 4/10 today. 2/15: Pt reports no inc soreness after initial aquatic therapy.    Relevant Medical History:Additional Pertinent Hx: HTN, back surgery 2014/2015  Functional Disability Index:  NDI   21     Pain Scale: 4/10 neck    Easing factors:  Rest.  Provocative factors:  Initial AM/ too much activity     Type: [x]? Constant       []? Intermittent  []? Radiating     []? Localized     []? other:                Numbness/Tingling: through UE's     Occupation/School:  Currently unable to work     Living Status/Prior Level of Function: lives with family. [address] home with railing. Indep with self care. Difficulty putting on pants on right leg, difficulty with sock and shoe on right side as well. Difficulty descending steps. Sleeps in recliner 2/2 neck and back issues.      SUBJECTIVE: Pt reports weakness in his arms at times with NT, also bilateral hands. Notes weakness in both legs R>L      OBJECTIVE:   Palpation: no specific TTP noted. Has mild pitting edema moreso on right than left.     Functional Mobility/Transfers: Mod I - needs arms of chair for leverage    UE -  ROM  WFL  Strength  Grossly 4-/5 throughout     LE ROM -  WFL mario LE's, increased effort to initiate movement RLE  LE strength - grossly 4/5 left,  4-/5 right.     Balance -  Feet together - wfl ,  Modified tandem min sway.   Tandem  R/L increased sway with R/L  - unable         Land-based Visits Exercises/Activities:  Therapeutic Ex (14581)  Min: Resistance/Repetitions Notes   Tandem stance               Therapeutic Activity (79831) Min: 25     Pool tour, policies and procedures Pt demonstrates understanding    Review of home set up/modifications     Review of LE swelling and options to manage    REview of gait issues with balance and options to manage     NMR re-education (28070) Min:                          Manual Intervention (69989)  Min:                    Modalities  Min:               Aquatic Visits Exercises/Activities:  Aquatic Abbreviation Key  B= Belt DB= Dumbells T= Theratube   G=Gloves N= Noodles W= Weights   P= Paddles WW=Water Walker K= Kickboard        Transfers:   steps with bilat handrails WRIST flex/ext, pron/sup 10/10    % Immersion: 75-80% FINGER flex/ext 10          Ambulation:   Exercises UE:      Forwards 3+1+1 with gentle UE mvt Shoulder Shrugs     Lateral 2 with abd/add UE mvt Shoulder circles 10    Marching    Scapular Retraction  10    Retro   Rolling      [place]as  Push Downs 10   Multifidus walkouts w/paddle  IR/ER 10     Punching 10   Stretching:  Rowing 10   Gastroc/Soleus   Elbow Flex/Ext 10   Hamstring  30 sec x 2 bilat Shldr Flex/Ext  10   Knee flex stretch   Shldr Abd/Add 10   Piriformis   Horiz Abd/Add  10   Hip flexor    Arm Circles  10   SKTC   PNF Diagonals    DKTC  UE oscillations f/b, s/s    ITB   Wall Push-ups    Quad  Figure 8's    Mid back   Buoy pull/push downs    UT  Tband rows    Rhom  Tband lats    Post Shoulder  Lumbar Rot w/ paddles    Pec   Small ball pull downs                   diagonals 10     Small ball pool to deck 10      Cervical:       AROM Flex 5      AROM Extension     LEs exercises:   AROM Side Bending    Marching  10 AROM Rotation 5/5 R/L   Heel Raises  10 Chin tucks    Toe Raises  10 Chin nods     Squats  10      Hamstring Curls  10      Hip Flexion  10 Balance:      Hip Abduction 10 SLS    Hip Circles  Tandem stance    TA set  NBOS eyes open    Glut Set  NBOS eyes closed    Hip Extension  Hand to Opposite Knee    Hip Adduction    Box Step     Hip IR   Noodle Stance     Hip ER  Stop/Go Gait    Fig 8's  Switch Gait      Foot on step          Seated:  Functional:    Ankle Pumps  Step up forward    Ankle circles  Step up lateral    Knee flex & ext  Step down    Hip Abd & Adduction  [place] squats    Bicycle   Crate Lifts    Add Set with ball  Lunges forward    LX stab with med ball throws  Lunges lateral    Ankle INV  Lunges retro    Ankle EV  Lower ab curl with noodle      Upper ab curl with ball      Med ball straight lifts      Med ball diagonal lifts      Foot onto/off of proper LE, core and proximal hip recruitment and positioning and eccentric body weight control with ambulation re-education including up and down stairs     Home Exercise Program:    [x] (44012) Reviewed/Progressed HEP activities related to strengthening, flexibility, endurance, ROM of core, proximal hip and LE for functional self-care, mobility, lifting and ambulation/stair navigation   [] (25003)Reviewed/Progressed HEP activities related to improving balance, coordination, kinesthetic sense, posture, motor skill, proprioception of core, proximal hip and LE for self care, mobility, lifting, and ambulation/stair navigation      Manual Treatments:  PROM / STM / Oscillations-Mobs:  G-I, II, III, IV (PA's, Inf., Post.)  [] (60886) Provided manual therapy to mobilize LE, proximal hip and/or LS spine soft tissue/joints for the purpose of modulating pain, promoting relaxation,  increasing ROM, reducing/eliminating soft tissue swelling/inflammation/restriction, improving soft tissue extensibility and allowing for proper ROM for normal function with self care, mobility, lifting and ambulation.      Individual Aquatic Therapy:  [x] (89930) Provided verbal and tactile cueing for strengthening, flexibility, ROM using the therapeutic properties of water (buoyancy, resistance) for improvements in core control, mobility and ambulation     Aquatic Group:  [x] (06356) Provided intermittent verbal and tactile cueing for strengthening, endurance, flexibility and ROM for 2-4 individuals that do not require one-on one patient contact by the therapist       Land Timed Code Treatment Minutes: 25   Land Total Treatment Minutes: 40     Individual Aquatic Minutes: 35   Aquatic Group Minutes: 5     [] EVAL (LOW) 02578 (typically 20 minutes face-to-face)  [x] EVAL (MOD) 88177 (typically 30 minutes face-to-face)  [] EVAL (HIGH) 02908 (typically 45 minutes face-to-face)  [] RE-EVAL     [] PO(85988) x     [] Dry needle 1 or 2 Muscles (75217)  [] NMR (31203) x     [] Dry needle 3+ Muscles (35441)  [] Manual (16451) x     [] Ultrasound (34732) x  [x] TA (19382) x    2 [] Mech Traction (69751)  [] ES(attended) (99237)     [] ES (un) (47816):   [] Vasopump (83539) [] Ionto (80738)   [x] Aquatic Ind (37076) x 2   [x] Aquatic Group (19430) x    [] Other:    Treatment/Activity Tolerance:  [x] Patient tolerated treatment well [] Patient limited by fatigue  [] Patient limited by pain  [] Patient limited by other medical complications  [] Other:     Prognosis: [] Good [] Fair  [] Poor     ASSESSMENT Pt with history of UE/LE weakness 2/2 cord compression. Will benefit from aquatic therapy   2/10: Pt completed exercises as instructed. Pt notes dec pain after aquatic therapy. Pt would benefit from skilled instruction for UE exer, cervical ROM, balance and posture awareness to dec pain, inc ROM and improve function. 2/15: Pt without c/o throughout treatment. Verbal cues needed to continue to look ahead / neutral neck posture as pt tends to look down into pool during exercises. Pt able to correct with cues. Pt would benefit from continued skilled instruction for posture and exercises to inc strength and posture awareness. GOALS:  Patient stated goal: TO be able to move better and not have as much pain  []? Progressing: []? Met: []? Not Met: []? Adjusted     Therapist goals for Patient:   Short Term Goals: To be achieved in: 2 weeks  1. Independent in HEP and progression per patient tolerance, in order to prevent re-injury. []? Progressing: []? Met: []? Not Met: []? Adjusted  2. Patient will have a decrease in pain to facilitate improvement in movement, function, and ADLs as indicated by Functional Deficits. []? Progressing: []? Met: []? Not Met: []? Adjusted     Long Term Goals: To be achieved in: 12 weeks  1. Disability index score of 30% or less for the NDI to assist with reaching prior level of function. []? Progressing: []? Met: []?  Not Met: []? Adjusted  2. Patient will demonstrate increased AROM to [facility] of cervical/thoracic spine to allow for proper joint functioning as indicated by patients Functional Deficits. []? Progressing: []? Met: []? Not Met: []? Adjusted  3. Patient will demonstrate an increase in postural awareness and control and activation of  Deep cervical stabilizers to allow for proper functional mobility as indicated by patients Functional Deficits. []? Progressing: []? Met: []? Not Met: []? Adjusted  4. Patient will return to 70% functional activities without increased symptoms or restriction. []? Progressing: []? Met: []? Not Met: []? Adjusted  5. To be able to walk better with better balance. []? Progressing: []? Met: []? Not Met: []? Adjusted                 Patient Requires Follow-up: [x] Yes  [] No    Plan:   [x] Continue per plan of care [] Alter current plan (see comments)  [] Plan of care initiated [] Hold pending MD visit [] Discharge    Plan for Next Session:   Add;  UE diagonals, oscillations, cervical ROM as tolerated. Electronically signed by:  Lucinda Padron, PTA [id number]  Note: If patient does not return for scheduled/recommended follow up visits, this note will serve as a discharge from care along with the most recent update on progress.

## 2022-02-17 ENCOUNTER — HOSPITAL ENCOUNTER (OUTPATIENT)
Dept: PHYSICAL THERAPY | Age: 42
Setting detail: THERAPIES SERIES
Discharge: HOME | End: 2022-02-17
Payer: OTHER GOVERNMENT

## 2022-02-17 PROCEDURE — 97113 AQUATIC THERAPY/EXERCISES: CPT

## 2022-02-17 PROCEDURE — 97150 GROUP THERAPEUTIC PROCEDURES: CPT

## 2022-02-17 NOTE — FLOWSHEET NOTE
[address], [facility]  [address] [facility], [facility]  Phone: [phone]   Fax:     [phone]      Physical Therapy Daily/Aquatic Flow Sheet   Date:  2022    Patient Name:  Mitch Palmer    :  1980  MRN: [id number]    Restrictions/Precautions:   None noted  Pertinent Medical History:Additional Pertinent Hx: HTN, back surgery     Medical/Treatment Diagnosis Information:   · Diagnosis: M54.2 (ICD-10-CM) - Neck pain  · Treatment Diagnosis: Decreased functional mobility 2/2 neck pain. Insurance/Certification information:  PT Insurance Information: Kunal  Physician Information:  Referring Practitioner: Eliel Emden of care signed (Y/N): sent    Date of Patient follow up with Physician:      Progress Report: []  Yes  [x]  No     Date Range for reporting period:  Beginnin2022  Ending:      Progress report due (10 Rx/or 30 days whichever is less): 3/8     Recertification due (POC duration/ or 90 days whichever is less):      Visit # POC/Insurance Allowable Auth Needed   4 30 []Yes   [x]No     Latex Allergy:  [x]NO      []YES  Preferred Language for Healthcare:   [x]English       []Other:    History of Present Condition   21     POSTERIOR CERVICAL LAMINIOPLASTY C4-5-6-7 ; ANTERIOR CERVICAL DISCECTOMY FUSION C4-5, C5-6 , C6-7 ASPIRATE BONE MARROW RIGHT ILIAC CREST AND C6 CORPECTOMY            MD note 22  The patient is now approximately six to seven months post anterior-posterior fusion. His sequential scans show marked improvement in the cervical stenosis, cord compression and swelling. The swelling in his cord is improving. His subarachnoid space is now circumferentially present.           SUBJECTIVE: Pt reports weakness in his arms at times with NT, also bilateral hands. Notes weakness in both legs R>L  2/10: My neck pain is 4/10 today. 2/15: Pt reports no inc soreness after initial aquatic therapy.    : My back is really bothering me today maybe because of the weather.      Relevant Medical History:Additional Pertinent Hx: HTN, back surgery 2014/2015  Functional Disability Index:  NDI   21     Pain Scale: 0/10 neck    Easing factors:  Rest.  Provocative factors:  Initial AM/ too much activity     Type: [x]? Constant       []? Intermittent  []? Radiating     []? Localized     []? other:                Numbness/Tingling: through UE's     Occupation/School:  Currently unable to work     Living Status/Prior Level of Function: lives with family. [address] home with railing. Indep with self care. Difficulty putting on pants on right leg, difficulty with sock and shoe on right side as well. Difficulty descending steps. Sleeps in recliner 2/2 neck and back issues.      SUBJECTIVE: Pt reports weakness in his arms at times with NT, also bilateral hands. Notes weakness in both legs R>L      OBJECTIVE:   Palpation: no specific TTP noted. Has mild pitting edema moreso on right than left.     Functional Mobility/Transfers: Mod I - needs arms of chair for leverage    UE -  ROM  WFL  Strength  Grossly 4-/5 throughout     LE ROM -  WFL mario LE's, increased effort to initiate movement RLE  LE strength - grossly 4/5 left,  4-/5 right.     Balance -  Feet together - wfl ,  Modified tandem min sway.   Tandem  R/L increased sway with R/L  - unable         Land-based Visits Exercises/Activities:  Therapeutic Ex (78806)  Min: Resistance/Repetitions Notes   Tandem stance               Therapeutic Activity (83028) Min: 25     Pool tour, policies and procedures Pt demonstrates understanding    Review of home set up/modifications     Review of LE swelling and options to manage    REview of gait issues with balance and options to manage     NMR re-education (73941) Min:                          Manual Intervention (18648)  Min:                    Modalities  Min:               Aquatic Visits Exercises/Activities:  Aquatic Abbreviation Key  B= Belt DB= curl with noodle      Upper ab curl with ball      Med ball straight lifts      Med ball diagonal lifts      Foot onto/off of step 10 R/L with HHA prn         Noodle:      Leg Press  Deep Water:    Noodle hang at wall  Jog    Noodle hang deep water  Jumping Jacks    Noodle Bicycle  Heel to toe      Hand to opposite knee      Cross country skier      Rocking Horse    2/10: Discussed with patient in length sitting with 1-2 pillows underneath arms in order to promote proper sitting posture as well as put any book, tablet, reading material at face level. Pt verbalized understanding. Other Therapeutic Activities:  Pt was educated on PT POC, Diagnosis, Prognosis, pathomechanics as well as frequency and duration of scheduling future physical therapy appointments. Time was also taken on this day to answer all patient questions and participation in PT. Reviewed appointment policy in detail with patient and patient verbalized understanding. Home Exercise Program:  Patient was instructed in the following for HEP:     . Patient verbalized/demonstrated understanding and was issued written handout. Charges: Therapeutic Exercise and NMR EXR  [] (73988) Provided verbal/tactile cueing for activities related to strengthening, flexibility, endurance, ROM for improvements in LE, proximal hip, and core control with self care, mobility, lifting, ambulation.  [] (69306) Provided verbal/tactile cueing for activities related to improving balance, coordination, kinesthetic sense, posture, motor skill, proprioception  to assist with LE, proximal hip, and core control in self care, mobility, lifting, ambulation and eccentric single leg control.      NMR and Therapeutic Activities:    [] (70232 or 81318) Provided verbal/tactile cueing for activities related to improving balance, coordination, kinesthetic sense, posture, motor skill, proprioception and motor activation to allow for proper function of core, proximal hip and LE with self care and ADLs and functional mobility.   [] (87173) Gait Re-education- Provided training and instruction to the patient for proper LE, core and proximal hip recruitment and positioning and eccentric body weight control with ambulation re-education including up and down stairs     Home Exercise Program:    [x] (97888) Reviewed/Progressed HEP activities related to strengthening, flexibility, endurance, ROM of core, proximal hip and LE for functional self-care, mobility, lifting and ambulation/stair navigation   [] (37184)Reviewed/Progressed HEP activities related to improving balance, coordination, kinesthetic sense, posture, motor skill, proprioception of core, proximal hip and LE for self care, mobility, lifting, and ambulation/stair navigation      Manual Treatments:  PROM / STM / Oscillations-Mobs:  G-I, II, III, IV (PA's, Inf., Post.)  [] (23628) Provided manual therapy to mobilize LE, proximal hip and/or LS spine soft tissue/joints for the purpose of modulating pain, promoting relaxation,  increasing ROM, reducing/eliminating soft tissue swelling/inflammation/restriction, improving soft tissue extensibility and allowing for proper ROM for normal function with self care, mobility, lifting and ambulation.      Individual Aquatic Therapy:  [x] (01002) Provided verbal and tactile cueing for strengthening, flexibility, ROM using the therapeutic properties of water (buoyancy, resistance) for improvements in core control, mobility and ambulation     Aquatic Group:  [x] (33892) Provided intermittent verbal and tactile cueing for strengthening, endurance, flexibility and ROM for 2-4 individuals that do not require one-on one patient contact by the therapist       Land Timed Code Treatment Minutes: 25   Land Total Treatment Minutes: 40     Individual Aquatic Minutes: 35   Aquatic Group Minutes: 10     [] EVAL (LOW) 03136 (typically 20 minutes face-to-face)  [x] EVAL (MOD) 92491 (typically 30 minutes face-to-face)  [] EVAL (HIGH) 85257 (typically 45 minutes face-to-face)  [] RE-EVAL     [] CC(90169) x     [] Dry needle 1 or 2 Muscles (35095)  [] NMR (57913) x     [] Dry needle 3+ Muscles (00485)  [] Manual (34643) x     [] Ultrasound (68990) x  [x] TA (82277) x    2 [] Mech Traction (75087)  [] ES(attended) (53341)     [] ES (un) (21975):   [] Vasopump (57105) [] Ionto (75636)   [x] Aquatic Ind (25481) x 2   [x] Aquatic Group (20512) x  1  [] Other:    Treatment/Activity Tolerance:  [x] Patient tolerated treatment well [] Patient limited by fatigue  [] Patient limited by pain  [] Patient limited by other medical complications  [] Other:     Prognosis: [] Good [] Fair  [] Poor     ASSESSMENT Pt with history of UE/LE weakness 2/2 cord compression. Will benefit from aquatic therapy   2/10: Pt completed exercises as instructed. Pt notes dec pain after aquatic therapy. Pt would benefit from skilled instruction for UE exer, cervical ROM, balance and posture awareness to dec pain, inc ROM and improve function. 2/15: Pt without c/o throughout treatment. Verbal cues needed to continue to look ahead / neutral neck posture as pt tends to look down into pool during exercises. Pt able to correct with cues. Pt would benefit from continued skilled instruction for posture and exercises to inc strength and posture awareness. 2/17: Pt tolerated exercises however pt notes LB pain continued. Pt demonstrating improved posture today. Pt will be benefit from continued skilled instruction to progress exercises for inc strength for ADL's and posture. GOALS:  Patient stated goal: TO be able to move better and not have as much pain  []? Progressing: []? Met: []? Not Met: []? Adjusted     Therapist goals for Patient:   Short Term Goals: To be achieved in: 2 weeks  1. Independent in HEP and progression per patient tolerance, in order to prevent re-injury. []? Progressing: []? Met: []? Not Met: []? Adjusted  2.  Patient will have a decrease in pain to facilitate improvement in movement, function, and ADLs as indicated by Functional Deficits. []? Progressing: []? Met: []? Not Met: []? Adjusted     Long Term Goals: To be achieved in: 12 weeks  1. Disability index score of 30% or less for the NDI to assist with reaching prior level of function. []? Progressing: []? Met: []? Not Met: []? Adjusted  2. Patient will demonstrate increased AROM to [facility] of cervical/thoracic spine to allow for proper joint functioning as indicated by patients Functional Deficits. []? Progressing: []? Met: []? Not Met: []? Adjusted  3. Patient will demonstrate an increase in postural awareness and control and activation of  Deep cervical stabilizers to allow for proper functional mobility as indicated by patients Functional Deficits. []? Progressing: []? Met: []? Not Met: []? Adjusted  4. Patient will return to 70% functional activities without increased symptoms or restriction. []? Progressing: []? Met: []? Not Met: []? Adjusted  5. To be able to walk better with better balance. []? Progressing: []? Met: []? Not Met: []? Adjusted           Patient Requires Follow-up: [x] Yes  [] No    Plan:   [x] Continue per plan of care [] Alter current plan (see comments)  [] Plan of care initiated [] Hold pending MD visit [] Discharge    Plan for Next Session:   Add;  theraband UE exer for posture and strength as tolerated. Electronically signed by:  Carmela Dubon PTA [id number]  Note: If patient does not return for scheduled/recommended follow up visits, this note will serve as a discharge from care along with the most recent update on progress.

## 2022-02-22 ENCOUNTER — HOSPITAL ENCOUNTER (OUTPATIENT)
Dept: PHYSICAL THERAPY | Age: 42
Setting detail: THERAPIES SERIES
Discharge: HOME | End: 2022-02-22
Payer: OTHER GOVERNMENT

## 2022-02-22 PROCEDURE — 97113 AQUATIC THERAPY/EXERCISES: CPT

## 2022-02-22 NOTE — FLOWSHEET NOTE
[address], [place]  [address] [facility]  Phone: [phone]   Fax:     [phone]      Physical Therapy Daily/Aquatic Flow Sheet   Date:  2022    Patient Name:  Paula Chawla    :  1980  MRN: [id number]    Restrictions/Precautions:   None noted  Pertinent Medical History:Additional Pertinent Hx: HTN, back surgery     Medical/Treatment Diagnosis Information:   · Diagnosis: M54.2 (ICD-10-CM) - Neck pain  · Treatment Diagnosis: Decreased functional mobility 2/2 neck pain. Insurance/Certification information:  PT Insurance Information: Kunal  Physician Information:  Referring Practitioner: Shine Innocent of care signed (Y/N): sent    Date of Patient follow up with Physician:      Progress Report: []  Yes  [x]  No     Date Range for reporting period:  Beginnin2022  Ending:      Progress report due (10 Rx/or 30 days whichever is less): 3/8     Recertification due (POC duration/ or 90 days whichever is less):      Visit # POC/Insurance Allowable Auth Needed   5 30 []Yes   [x]No     Latex Allergy:  [x]NO      []YES  Preferred Language for Healthcare:   [x]English       []Other:    History of Present Condition   21     POSTERIOR CERVICAL LAMINIOPLASTY C4-5-6-7 ; ANTERIOR CERVICAL DISCECTOMY FUSION C4-5, C5-6 , C6-7 ASPIRATE BONE MARROW RIGHT ILIAC CREST AND C6 CORPECTOMY            MD note 22  The patient is now approximately six to seven months post anterior-posterior fusion. His sequential scans show marked improvement in the cervical stenosis, cord compression and swelling. The swelling in his cord is improving. His subarachnoid space is now circumferentially present.           SUBJECTIVE: Pt reports weakness in his arms at times with NT, also bilateral hands. Notes weakness in both legs R>L  2/10: My neck pain is 4/10 today. 2/15: Pt reports no inc soreness after initial aquatic therapy.    : My back is really bothering me today maybe because of the weather. 2/22: I am now able to sleep in a bed.     Relevant Medical History:Additional Pertinent Hx: HTN, back surgery 2014/2015  Functional Disability Index:  NDI   21     Pain Scale: 1/10 neck    Easing factors:  Rest.  Provocative factors:  Initial AM/ too much activity     Type: [x]? Constant       []? Intermittent  []? Radiating     []? Localized     []? other:                Numbness/Tingling: through UE's     Occupation/School:  Currently unable to work     Living Status/Prior Level of Function: lives with family. [address] home with railing. Indep with self care. Difficulty putting on pants on right leg, difficulty with sock and shoe on right side as well. Difficulty descending steps. Sleeps in recliner 2/2 neck and back issues.      SUBJECTIVE: Pt reports weakness in his arms at times with NT, also bilateral hands. Notes weakness in both legs R>L      OBJECTIVE:   Palpation: no specific TTP noted. Has mild pitting edema moreso on right than left.     Functional Mobility/Transfers: Mod I - needs arms of chair for leverage    UE -  ROM  WFL  Strength  Grossly 4-/5 throughout     LE ROM -  WFL mario LE's, increased effort to initiate movement RLE  LE strength - grossly 4/5 left,  4-/5 right.     Balance -  Feet together - wfl ,  Modified tandem min sway.   Tandem  R/L increased sway with R/L  - unable         Land-based Visits Exercises/Activities:  Therapeutic Ex (88502)  Min: Resistance/Repetitions Notes   Tandem stance               Therapeutic Activity (31179) Min: 25     Pool tour, policies and procedures Pt demonstrates understanding    Review of home set up/modifications     Review of LE swelling and options to manage    REview of gait issues with balance and options to manage     NMR re-education (63198) Min:                          Manual Intervention (05839)  Min:                    Modalities  Min:               Aquatic Visits Exercises/Activities:  Aquatic Abbreviation Key  B= Belt DB= Dumbells T= Theratube   G=Gloves N= Noodles W= Weights   P= Paddles WW=Water Walker K= Kickboard     **Pt 10 min late today. **   Transfers:   steps with bilat handrails WRIST flex/ext, pron/sup 10/10    % Immersion: 75-80% FINGER flex/ext 10          Ambulation:   Exercises UE:      Forwards 4+1 with gentle UE mvt Shoulder Shrugs     Lateral 2 with abd/add UE mvt Shoulder circles 10    Marching    Scapular Retraction  10    Retro   Rolling      [place]  Push Downs 10   Multifidus walkouts w/paddle  IR/ER 10     Punching 10   Stretching:  Rowing 10   Gastroc/Soleus   Elbow Flex/Ext 10   Hamstring  30 sec x 2 bilat Shldr Flex/Ext  10   Knee flex stretch   Shldr Abd/Add 10   Piriformis   Horiz Abd/Add  10   Hip flexor    Arm Circles  10   SKTC   PNF Diagonals    DKTC  UE oscillations f/b, s/s    ITB   Wall Push-ups    Quad  Figure 8's    Mid back   Buoy pull/push downs    UT  Tband rows/push Red 10/10   Rhom  Tband lats/ biceps Red 10   Post Shoulder  Lumbar Rot w/ paddles    Pec   Small ball pull downs                   diagonals 15     2# Small ball pool to deck 15      Cervical:       AROM Flex 10      AROM Extension     LEs exercises:   AROM Side Bending    Marching  10 AROM Rotation 10/10 R/L   Heel Raises  10 Chin tucks    Toe Raises  10 Chin nods     Squats  10      Hamstring Curls  10      Hip Flexion  10 Balance:      Hip Abduction 10 SLS    Hip Circles 10 Tandem stance    TA set  NBOS eyes open    Glut Set  NBOS eyes closed    Hip Extension  Hand to Opposite Knee 10   Hip Adduction    Box Step     Hip IR   Noodle Stance     Hip ER  Stop/Go Gait    Fig 8's  Switch Gait      Foot on step 30 sec R/L without LOB         Seated:  Functional:    Ankle Pumps  Step up forward 5 R/L   Ankle circles  Step up lateral    Knee flex & ext  Step down    Hip Abd & Adduction  [place] squats    Bicycle   Crate Lifts 10   Add Set with ball  Lunges forward    LX stab with med ball throws  Lunges lateral    Ankle INV  Lunges retro    Ankle EV  Lower ab curl with noodle      Upper ab curl with ball      Med ball straight lifts      Med ball diagonal lifts      Foot onto/off of step 10 R/L with HHA prn         Noodle:      Leg Press  Deep Water:    Noodle hang at wall  Jog    Noodle hang deep water  Jumping Jacks    Noodle Bicycle  Heel to toe      Hand to opposite knee      Cross country skier      Rocking Horse    2/10: Discussed with patient in length sitting with 1-2 pillows underneath arms in order to promote proper sitting posture as well as put any book, tablet, reading material at face level. Pt verbalized understanding. Other Therapeutic Activities:  Pt was educated on PT POC, Diagnosis, Prognosis, pathomechanics as well as frequency and duration of scheduling future physical therapy appointments. Time was also taken on this day to answer all patient questions and participation in PT. Reviewed appointment policy in detail with patient and patient verbalized understanding. Home Exercise Program:  Patient was instructed in the following for HEP:     . Patient verbalized/demonstrated understanding and was issued written handout. Charges: Therapeutic Exercise and NMR EXR  [] (70682) Provided verbal/tactile cueing for activities related to strengthening, flexibility, endurance, ROM for improvements in LE, proximal hip, and core control with self care, mobility, lifting, ambulation.  [] (83564) Provided verbal/tactile cueing for activities related to improving balance, coordination, kinesthetic sense, posture, motor skill, proprioception  to assist with LE, proximal hip, and core control in self care, mobility, lifting, ambulation and eccentric single leg control.      NMR and Therapeutic Activities:    [] (38452 or 29952) Provided verbal/tactile cueing for activities related to improving balance, coordination, kinesthetic sense, posture, motor skill, proprioception and motor activation to allow for proper function of core, proximal hip and LE with self care and ADLs and functional mobility.   [] (23765) Gait Re-education- Provided training and instruction to the patient for proper LE, core and proximal hip recruitment and positioning and eccentric body weight control with ambulation re-education including up and down stairs     Home Exercise Program:    [x] (15710) Reviewed/Progressed HEP activities related to strengthening, flexibility, endurance, ROM of core, proximal hip and LE for functional self-care, mobility, lifting and ambulation/stair navigation   [] (16933)Reviewed/Progressed HEP activities related to improving balance, coordination, kinesthetic sense, posture, motor skill, proprioception of core, proximal hip and LE for self care, mobility, lifting, and ambulation/stair navigation      Manual Treatments:  PROM / STM / Oscillations-Mobs:  G-I, II, III, IV (PA's, Inf., Post.)  [] (51356) Provided manual therapy to mobilize LE, proximal hip and/or LS spine soft tissue/joints for the purpose of modulating pain, promoting relaxation,  increasing ROM, reducing/eliminating soft tissue swelling/inflammation/restriction, improving soft tissue extensibility and allowing for proper ROM for normal function with self care, mobility, lifting and ambulation.      Individual Aquatic Therapy:  [x] (73992) Provided verbal and tactile cueing for strengthening, flexibility, ROM using the therapeutic properties of water (buoyancy, resistance) for improvements in core control, mobility and ambulation     Aquatic Group:  [] (33037) Provided intermittent verbal and tactile cueing for strengthening, endurance, flexibility and ROM for 2-4 individuals that do not require one-on one patient contact by the therapist       Land Timed Code Treatment Minutes: 25   Land Total Treatment Minutes: 40     Individual Aquatic Minutes: 45   Aquatic Group Minutes:      [] KATE (LOW) [id number] Short Term Goals: To be achieved in: 2 weeks  1. Independent in HEP and progression per patient tolerance, in order to prevent re-injury. []? Progressing: []? Met: []? Not Met: []? Adjusted  2. Patient will have a decrease in pain to facilitate improvement in movement, function, and ADLs as indicated by Functional Deficits. []? Progressing: []? Met: []? Not Met: []? Adjusted     Long Term Goals: To be achieved in: 12 weeks  1. Disability index score of 30% or less for the NDI to assist with reaching prior level of function. []? Progressing: []? Met: []? Not Met: []? Adjusted  2. Patient will demonstrate increased AROM to [facility] of cervical/thoracic spine to allow for proper joint functioning as indicated by patients Functional Deficits. []? Progressing: []? Met: []? Not Met: []? Adjusted  3. Patient will demonstrate an increase in postural awareness and control and activation of  Deep cervical stabilizers to allow for proper functional mobility as indicated by patients Functional Deficits. []? Progressing: []? Met: []? Not Met: []? Adjusted  4. Patient will return to 70% functional activities without increased symptoms or restriction. []? Progressing: []? Met: []? Not Met: []? Adjusted  5. To be able to walk better with better balance. []? Progressing: []? Met: []? Not Met: []? Adjusted           Patient Requires Follow-up: [x] Yes  [] No    Plan:   [x] Continue per plan of care [] Alter current plan (see comments)  [] Plan of care initiated [] Hold pending MD visit [] Discharge    Plan for Next Session:   Add; inc theraband UE exer for posture and strength as tolerated. Inc forward step ups x 10. Electronically signed by:  Sara Gonzalez, PTA [id number]  Note: If patient does not return for scheduled/recommended follow up visits, this note will serve as a discharge from care along with the most recent update on progress.

## 2022-02-24 ENCOUNTER — HOSPITAL ENCOUNTER (OUTPATIENT)
Dept: PHYSICAL THERAPY | Age: 42
Setting detail: THERAPIES SERIES
Discharge: HOME | End: 2022-02-24
Payer: OTHER GOVERNMENT

## 2022-02-24 PROCEDURE — 97113 AQUATIC THERAPY/EXERCISES: CPT

## 2022-02-24 NOTE — FLOWSHEET NOTE
[address] [facility],[address], [facility]  [address] [facility] [place], [facility]  Phone: [phone]   Fax:     [phone]      Physical Therapy Daily/Aquatic Flow Sheet   Date:  2022    Patient Name:  Anthony Cisneros    :  1980  MRN: [id number]    Restrictions/Precautions:   None noted  Pertinent Medical History:Additional Pertinent Hx: HTN, back surgery     Medical/Treatment Diagnosis Information:   · Diagnosis: M54.2 (ICD-10-CM) - Neck pain  · Treatment Diagnosis: Decreased functional mobility 2/2 neck pain. Insurance/Certification information:  PT Insurance Information: Kunal  Physician Information:  Referring Practitioner: Edgar Lizama of care signed (Y/N): sent    Date of Patient follow up with Physician:      Progress Report: []  Yes  [x]  No     Date Range for reporting period:  Beginnin2022  Ending:      Progress report due (10 Rx/or 30 days whichever is less): 3/8     Recertification due (POC duration/ or 90 days whichever is less):      Visit # POC/Insurance Allowable Auth Needed   6 30 []Yes   [x]No     Latex Allergy:  [x]NO      []YES  Preferred Language for Healthcare:   [x]English       []Other:    History of Present Condition   21     POSTERIOR CERVICAL LAMINIOPLASTY C4-5-6-7 ; ANTERIOR CERVICAL DISCECTOMY FUSION C4-5, C5-6 , C6-7 ASPIRATE BONE MARROW RIGHT ILIAC CREST AND C6 CORPECTOMY            MD note 22  The patient is now approximately six to seven months post anterior-posterior fusion. His sequential scans show marked improvement in the cervical stenosis, cord compression and swelling. The swelling in his cord is improving. His subarachnoid space is now circumferentially present.           SUBJECTIVE: Pt reports weakness in his arms at times with NT, also bilateral hands. Notes weakness in both legs R>L  2/10: My neck pain is 4/10 today. 2/15: Pt reports no inc soreness after initial aquatic therapy.    : My back is really bothering me today maybe because of the weather. 2/22: I am now able to sleep in a bed.  2/24: Pt reports neck is about the same.     Relevant Medical History:Additional Pertinent Hx: HTN, back surgery 2014/2015  Functional Disability Index:  NDI   21     Pain Scale: 1-2/10 neck    Easing factors:  Rest.  Provocative factors:  Initial AM/ too much activity     Type: [x]? Constant       []? Intermittent  []? Radiating     []? Localized     []? other:                Numbness/Tingling: through UE's     Occupation/School:  Currently unable to work     Living Status/Prior Level of Function: lives with family. [address] home with railing. Indep with self care. Difficulty putting on pants on right leg, difficulty with sock and shoe on right side as well. Difficulty descending steps. Sleeps in recliner 2/2 neck and back issues.      SUBJECTIVE: Pt reports weakness in his arms at times with NT, also bilateral hands. Notes weakness in both legs R>L      OBJECTIVE:   Palpation: no specific TTP noted. Has mild pitting edema moreso on right than left.     Functional Mobility/Transfers: Mod I - needs arms of chair for leverage    UE -  ROM  WFL  Strength  Grossly 4-/5 throughout     LE ROM -  WFL mario LE's, increased effort to initiate movement RLE  LE strength - grossly 4/5 left,  4-/5 right.     Balance -  Feet together - wfl ,  Modified tandem min sway.   Tandem  R/L increased sway with R/L  - unable         Land-based Visits Exercises/Activities:  Therapeutic Ex (15897)  Min: Resistance/Repetitions Notes   Tandem stance               Therapeutic Activity (64010) Min: 25     Pool tour, policies and procedures Pt demonstrates understanding    Review of home set up/modifications     Review of LE swelling and options to manage    REview of gait issues with balance and options to manage     NMR re-education (95185) Min:                          Manual Intervention (22650)  Min:                    Modalities  Min: Aquatic Visits Exercises/Activities:  Aquatic Abbreviation Key  B= Belt DB= Dumbells T= Theratube   G=Gloves N= Noodles W= Weights   P= Paddles WW=Water Walker K= Kickboard        Transfers:   steps with bilat handrails WRIST flex/ext, pron/sup 10/10    % Immersion: 75-80% FINGER flex/ext 10          Ambulation:   Exercises UE:      Forwards 4+1 with gentle UE mvt Shoulder Shrugs     Lateral 2 with abd/add UE mvt Shoulder circles 10    Marching   2 Scapular Retraction  10    Retro   Rolling  10    [place]  Push Downs 10   Multifidus walkouts w/paddle  IR/ER 10     Punching 10   Stretching:  Rowing 10   Gastroc/Soleus   Elbow Flex/Ext 10   Hamstring  30 sec x 2 bilat Shldr Flex/Ext  10   Knee flex stretch   Shldr Abd/Add 10   Piriformis   Horiz Abd/Add  10   Hip flexor    Arm Circles  10   SKTC   PNF Diagonals 10   DKTC  UE oscillations f/b, s/s 10 sec, 10 sec   ITB   Wall Push-ups 10   Quad  Figure 8's    Mid back   Buoy pull/push downs    UT  Tband rows/push Red 10/10   Rhom  Tband lats/ biceps Red 10/10   Post Shoulder  Lumbar Rot w/ paddles    Pec   Small ball pull downs                   diagonals 15     2# Small ball pool to deck 15      Cervical:       AROM Flex 10      AROM Extension     LEs exercises:   AROM Side Bending    Marching  10 AROM Rotation 10/10 R/L   Heel Raises  10 Chin tucks    Toe Raises  10 Chin nods     Squats  10      Hamstring Curls  10      Hip Flexion  10 Balance:      Hip Abduction 10 SLS    Hip Circles 10 Tandem stance    TA set  NBOS eyes open    Glut Set  NBOS eyes closed    Hip Extension  Hand to Opposite Knee 10   Hip Adduction    Box Step     Hip IR   Noodle Stance     Hip ER  Stop/Go Gait    Fig 8's  Switch Gait      Foot on step 30 sec R/L without LOB         Seated:  Functional:    Ankle Pumps  Step up forward 10 R/L   Ankle circles  Step up lateral    Knee flex & ext  Step down    Hip Abd & Adduction  [place] squats    Bicycle   Crate Lifts 10   Add Set with ball Lunges forward    LX stab with med ball throws  Lunges lateral    Ankle INV  Lunges retro    Ankle EV  Lower ab curl with noodle      Upper ab curl with ball      Med ball straight lifts      Med ball diagonal lifts      Foot onto/off of step 10 R/L with HHA prn         Noodle:      Leg Press  Deep Water:    Noodle hang at wall  Jog    Noodle hang deep water  Jumping Jacks    Noodle Bicycle  Heel to toe      Hand to opposite knee      Cross country skier      Rocking Horse    2/10: Discussed with patient in length sitting with 1-2 pillows underneath arms in order to promote proper sitting posture as well as put any book, tablet, reading material at face level. Pt verbalized understanding. Other Therapeutic Activities:  Pt was educated on PT POC, Diagnosis, Prognosis, pathomechanics as well as frequency and duration of scheduling future physical therapy appointments. Time was also taken on this day to answer all patient questions and participation in PT. Reviewed appointment policy in detail with patient and patient verbalized understanding. Home Exercise Program:  Patient was instructed in the following for HEP:     . Patient verbalized/demonstrated understanding and was issued written handout. Charges: Therapeutic Exercise and NMR EXR  [] (94920) Provided verbal/tactile cueing for activities related to strengthening, flexibility, endurance, ROM for improvements in LE, proximal hip, and core control with self care, mobility, lifting, ambulation.  [] (91264) Provided verbal/tactile cueing for activities related to improving balance, coordination, kinesthetic sense, posture, motor skill, proprioception  to assist with LE, proximal hip, and core control in self care, mobility, lifting, ambulation and eccentric single leg control.      NMR and Therapeutic Activities:    [] (89925 or 92249) Provided verbal/tactile cueing for activities related to improving balance, coordination, kinesthetic sense, posture, motor skill, proprioception and motor activation to allow for proper function of core, proximal hip and LE with self care and ADLs and functional mobility.   [] (78252) Gait Re-education- Provided training and instruction to the patient for proper LE, core and proximal hip recruitment and positioning and eccentric body weight control with ambulation re-education including up and down stairs     Home Exercise Program:    [x] (87872) Reviewed/Progressed HEP activities related to strengthening, flexibility, endurance, ROM of core, proximal hip and LE for functional self-care, mobility, lifting and ambulation/stair navigation   [] (91584)Reviewed/Progressed HEP activities related to improving balance, coordination, kinesthetic sense, posture, motor skill, proprioception of core, proximal hip and LE for self care, mobility, lifting, and ambulation/stair navigation      Manual Treatments:  PROM / STM / Oscillations-Mobs:  G-I, II, III, IV (PA's, Inf., Post.)  [] (44387) Provided manual therapy to mobilize LE, proximal hip and/or LS spine soft tissue/joints for the purpose of modulating pain, promoting relaxation,  increasing ROM, reducing/eliminating soft tissue swelling/inflammation/restriction, improving soft tissue extensibility and allowing for proper ROM for normal function with self care, mobility, lifting and ambulation.      Individual Aquatic Therapy:  [x] (00416) Provided verbal and tactile cueing for strengthening, flexibility, ROM using the therapeutic properties of water (buoyancy, resistance) for improvements in core control, mobility and ambulation     Aquatic Group:  [] (85605) Provided intermittent verbal and tactile cueing for strengthening, endurance, flexibility and ROM for 2-4 individuals that do not require one-on one patient contact by the therapist       Land Timed Code Treatment Minutes: 25   Land Total Treatment Minutes: 40     Individual Aquatic Minutes: 45   Aquatic Group Minutes:      [] KATE (LOW) 67517 (typically 20 minutes face-to-face)  [x] EVAL (MOD) 84325 (typically 30 minutes face-to-face)  [] EVAL (HIGH) 15817 (typically 45 minutes face-to-face)  [] RE-EVAL     [] NP(53111) x     [] Dry needle 1 or 2 Muscles (46819)  [] NMR (60033) x     [] Dry needle 3+ Muscles (19827)  [] Manual (06529) x     [] Ultrasound (03165) x  [x] TA (76983) x    2 [] Mech Traction (81551)  [] ES(attended) (61080)     [] ES (un) (75468):   [] Vasopump (45777) [] Ionto (95099)   [x] Aquatic Ind (74885) x 3   [] Aquatic Group (65975) x0  [] Other:    Treatment/Activity Tolerance:  [x] Patient tolerated treatment well [] Patient limited by fatigue  [] Patient limited by pain  [] Patient limited by other medical complications  [] Other:     Prognosis: [] Good [] Fair  [] Poor     ASSESSMENT Pt with history of UE/LE weakness 2/2 cord compression. Will benefit from aquatic therapy   2/10: Pt completed exercises as instructed. Pt notes dec pain after aquatic therapy. Pt would benefit from skilled instruction for UE exer, cervical ROM, balance and posture awareness to dec pain, inc ROM and improve function. 2/15: Pt without c/o throughout treatment. Verbal cues needed to continue to look ahead / neutral neck posture as pt tends to look down into pool during exercises. Pt able to correct with cues. Pt would benefit from continued skilled instruction for posture and exercises to inc strength and posture awareness. 2/17: Pt tolerated exercises however pt notes LB pain continued. Pt demonstrating improved posture today. Pt will be benefit from continued skilled instruction to progress exercises for inc strength for ADL's and posture. 2/22: Pt tolerates exercises with proper technique would benefit from skilled instruction to inc strength and continue improved posture. 2/24: Pt demonstrates improved posture and exercise technique. Pt would benefit from additional skilled instruction to inc strength and functional ADL's. GOALS:  Patient stated goal: TO be able to move better and not have as much pain  []? Progressing: []? Met: []? Not Met: []? Adjusted     Therapist goals for Patient:   Short Term Goals: To be achieved in: 2 weeks  1. Independent in HEP and progression per patient tolerance, in order to prevent re-injury. []? Progressing: []? Met: []? Not Met: []? Adjusted  2. Patient will have a decrease in pain to facilitate improvement in movement, function, and ADLs as indicated by Functional Deficits. []? Progressing: []? Met: []? Not Met: []? Adjusted     Long Term Goals: To be achieved in: 12 weeks  1. Disability index score of 30% or less for the NDI to assist with reaching prior level of function. []? Progressing: []? Met: []? Not Met: []? Adjusted  2. Patient will demonstrate increased AROM to [facility] of cervical/thoracic spine to allow for proper joint functioning as indicated by patients Functional Deficits. []? Progressing: []? Met: []? Not Met: []? Adjusted  3. Patient will demonstrate an increase in postural awareness and control and activation of  Deep cervical stabilizers to allow for proper functional mobility as indicated by patients Functional Deficits. []? Progressing: []? Met: []? Not Met: []? Adjusted  4. Patient will return to 70% functional activities without increased symptoms or restriction. []? Progressing: []? Met: []? Not Met: []? Adjusted  5. To be able to walk better with better balance. []? Progressing: []? Met: []? Not Met: []?  Adjusted           Patient Requires Follow-up: [x] Yes  [] No    Plan:   [x] Continue per plan of care [] Alter current plan (see comments)  [] Plan of care initiated [] Hold pending MD visit [] Discharge    Plan for Next Session:   Add; continue to progress      Electronically signed by:  Fco Love, PTA [id number]  Note: If patient does not return for scheduled/recommended follow up visits, this note will serve as a discharge from care along with the most recent update on progress.

## 2022-03-01 ENCOUNTER — HOSPITAL ENCOUNTER (OUTPATIENT)
Dept: PHYSICAL THERAPY | Age: 42
Setting detail: THERAPIES SERIES
Discharge: HOME | End: 2022-03-01
Payer: OTHER GOVERNMENT

## 2022-03-01 NOTE — FLOWSHEET NOTE
[place] [facility], [facility]  [address] [facility] [place], [facility]  Phone: [phone]   Fax:     [phone]    Physical Therapy  Cancellation/No-show Note  Patient Name:  Yasmin Moreno  :  1980   Date:  3/1/2022  Cancelled visits to date: 1  No-shows to date: 0    Patient status for today's appointment patient:  [x]  Cancelled  []  Rescheduled appointment  []  No-show     Reason given by patient:  []  Patient ill  [x]  Conflicting appointment  []  No transportation    []  Conflict with work  []  No reason given  []  Other:     Comments:      Phone call information:   []  Phone call made today to patient at _ time at number provided:      []  Patient answered, conversation as follows:    []  Patient did not answer, message left as follows:  []  Phone call not made today    Electronically signed by:  Delbert Mcfarlane, PTA [id number]

## 2022-03-03 ENCOUNTER — HOSPITAL ENCOUNTER (OUTPATIENT)
Dept: PHYSICAL THERAPY | Age: 42
Setting detail: THERAPIES SERIES
Discharge: HOME | End: 2022-03-03
Payer: OTHER GOVERNMENT

## 2022-03-03 PROCEDURE — 97113 AQUATIC THERAPY/EXERCISES: CPT

## 2022-03-03 NOTE — FLOWSHEET NOTE
[address] [facility],[address], [facility]  [address] [facility] [place], [facility]  Phone: [phone]   Fax:     [phone]      Physical Therapy Daily/Aquatic Flow Sheet   Date:  3/3/2022    Patient Name:  Denilson Gutierrez    :  1980  MRN: [id number]    Restrictions/Precautions:   None noted  Pertinent Medical History:Additional Pertinent Hx: HTN, back surgery     Medical/Treatment Diagnosis Information:   · Diagnosis: M54.2 (ICD-10-CM) - Neck pain  · Treatment Diagnosis: Decreased functional mobility 2/2 neck pain. Insurance/Certification information:  PT Insurance Information: Kunal  Physician Information:  Referring Practitioner: Yasmani Riggs of [facility] signed (Y/N): sent    Date of Patient follow up with Physician:      Progress Report: []  Yes  [x]  No     Date Range for reporting period:  Beginning:  3/3/2022  Ending:      Progress report due (10 Rx/or 30 days whichever is less): 3/8     Recertification due (POC duration/ or 90 days whichever is less):      Visit # POC/Insurance Allowable Auth Needed   7 30 []Yes   [x]No     Latex Allergy:  [x]NO      []YES  Preferred Language for Healthcare:   [x]English       []Other:    History of Present Condition   21     POSTERIOR CERVICAL LAMINIOPLASTY C4-5-6-7 ; ANTERIOR CERVICAL DISCECTOMY FUSION C4-5, C5-6 , C6-7 ASPIRATE BONE MARROW RIGHT ILIAC CREST AND C6 CORPECTOMY            MD note 22  The patient is now approximately six to seven months post anterior-posterior fusion. His sequential scans show marked improvement in the cervical stenosis, cord compression and swelling. The swelling in his cord is improving. His subarachnoid space is now circumferentially present.           SUBJECTIVE: Pt reports weakness in his arms at times with NT, also bilateral hands. Notes weakness in both legs R>L  2/10: My neck pain is 4/10 today. 2/15: Pt reports no inc soreness after initial aquatic therapy.    :  My back is really bothering me today maybe because of the weather. 2/22: I am now able to sleep in a bed.  2/24: Pt reports neck is about the same. 3/3: Pt reports the testing revealed no blood clots in legs. Pt reports not much change in neck pain after aquatics.      Relevant Medical History:Additional Pertinent Hx: HTN, back surgery 2014/2015  Functional Disability Index:  NDI   21     Pain Scale: 2/10 neck    Easing factors:  Rest.  Provocative factors:  Initial AM/ too much activity     Type: [x]? Constant       []? Intermittent  []? Radiating     []? Localized     []? other:                Numbness/Tingling: through UE's     Occupation/School:  Currently unable to work     Living Status/Prior Level of Function: lives with family. [address] home with railing. Indep with self care. Difficulty putting on pants on right leg, difficulty with sock and shoe on right side as well. Difficulty descending steps. Sleeps in recliner 2/2 neck and back issues.      SUBJECTIVE: Pt reports weakness in his arms at times with NT, also bilateral hands. Notes weakness in both legs R>L      OBJECTIVE:   Palpation: no specific TTP noted. Has mild pitting edema moreso on right than left.     Functional Mobility/Transfers: Mod I - needs arms of chair for leverage    UE -  ROM  WFL  Strength  Grossly 4-/5 throughout     LE ROM -  WFL mario LE's, increased effort to initiate movement RLE  LE strength - grossly 4/5 left,  4-/5 right.     Balance -  Feet together - wfl ,  Modified tandem min sway.   Tandem  R/L increased sway with R/L  - unable         Land-based Visits Exercises/Activities:  Therapeutic Ex (32119)  Min: Resistance/Repetitions Notes   Tandem stance               Therapeutic Activity (73115) Min: 25     Pool tour, policies and procedures Pt demonstrates understanding    Review of home set up/modifications     Review of LE swelling and options to manage    REview of gait issues with balance and options to manage     NMR re-education (74303) Min:                          Manual Intervention (35516)  Min:                    Modalities  Min:               Aquatic Visits Exercises/Activities:  Aquatic Abbreviation Key  B= Belt DB= Dumbells T= Theratube   G=Gloves N= Noodles W= Weights   P= Paddles WW=Water Walker K= Kickboard        Transfers:   steps with bilat handrails WRIST flex/ext, pron/sup    % Immersion: 75-80% FINGER flex/ext          Ambulation:   Exercises UE:      Forwards 4+1 with gentle UE mvt Shoulder Shrugs 10    Lateral 2 with abd/add UE mvt Shoulder circles 10    Marching   2 Scapular Retraction  10    Retro   Rolling  10    [place]as  Push Downs 10   Multifidus walkouts w/paddle  IR/ER 10     Punching 10   Stretching:  Rowing 10   Gastroc/Soleus   Elbow Flex/Ext 10   Hamstring  30 sec x 2 bilat Shldr Flex/Ext  10   Knee flex stretch   Shldr Abd/Add 10   Piriformis   Horiz Abd/Add  10   Hip flexor    Arm Circles  10   SKTC   PNF Diagonals 10   DKTC  UE oscillations f/b, s/s 10 sec, 10 sec   ITB   Wall Push-ups 10   Quad  Figure 8's    Mid back   Buoy pull/push downs    UT  Tband rows/push Red 15/15   Rhom  Tband lats/ biceps/triceps Red 15/15/15   Post Shoulder  Lumbar Rot w/ paddles    Pec   Small ball pull downs                   diagonals 15     2# Small ball pool to deck 15      Cervical:       AROM Flex 10      AROM Extension     LEs exercises:   AROM Side Bending    Marching  10 AROM Rotation 10/10 R/L   Heel Raises  10 Chin tucks    Toe Raises  10 Chin nods     Squats  10      Hamstring Curls  10      Hip Flexion  10 Balance:      Hip Abduction 10 SLS    Hip Circles 10 Tandem stance    TA set  NBOS eyes open    Glut Set  NBOS eyes closed    Hip Extension  Hand to Opposite Knee 10   Hip Adduction    Box Step     Hip IR   Noodle Stance     Hip ER  Stop/Go Gait    Fig 8's  Switch Gait      Foot on step 30 sec R/L without LOB         Seated:  Functional:    Ankle Pumps  Step up forward 10 R/L   Ankle circles  Step up lateral Knee flex & ext  Step down    Hip Abd & Adduction  Curtice squats    Bicycle   Crate Lifts 10   Add Set with ball  Lunges forward    LX stab with med ball throws  Lunges lateral    Ankle INV  Lunges retro    Ankle EV  Lower ab curl with noodle      Upper ab curl with ball      Med ball straight lifts      Med ball diagonal lifts      Foot onto/off of step 10 R/L with HHA prn         Noodle:      Leg Press  Deep Water:    Noodle hang at wall  Jog    Noodle hang deep water  Jumping Jacks    Noodle Bicycle  Heel to toe      Hand to opposite knee      Cross country skier      Rocking Horse    2/10: Discussed with patient in length sitting with 1-2 pillows underneath arms in order to promote proper sitting posture as well as put any book, tablet, reading material at face level. Pt verbalized understanding. Other Therapeutic Activities:  Pt was educated on PT POC, Diagnosis, Prognosis, pathomechanics as well as frequency and duration of scheduling future physical therapy appointments. Time was also taken on this day to answer all patient questions and participation in PT. Reviewed appointment policy in detail with patient and patient verbalized understanding. Home Exercise Program:  Patient was instructed in the following for HEP:     . Patient verbalized/demonstrated understanding and was issued written handout. Charges: Therapeutic Exercise and NMR EXR  [] (78049) Provided verbal/tactile cueing for activities related to strengthening, flexibility, endurance, ROM for improvements in LE, proximal hip, and core control with self care, mobility, lifting, ambulation.  [] (65395) Provided verbal/tactile cueing for activities related to improving balance, coordination, kinesthetic sense, posture, motor skill, proprioception  to assist with LE, proximal hip, and core control in self care, mobility, lifting, ambulation and eccentric single leg control.      NMR and Therapeutic Activities:    [] (77575 or 29255) Provided verbal/tactile cueing for activities related to improving balance, coordination, kinesthetic sense, posture, motor skill, proprioception and motor activation to allow for proper function of core, proximal hip and LE with self care and ADLs and functional mobility.   [] (35873) Gait Re-education- Provided training and instruction to the patient for proper LE, core and proximal hip recruitment and positioning and eccentric body weight control with ambulation re-education including up and down stairs     Home Exercise Program:    [x] (99164) Reviewed/Progressed HEP activities related to strengthening, flexibility, endurance, ROM of core, proximal hip and LE for functional self-care, mobility, lifting and ambulation/stair navigation   [] (91964)Reviewed/Progressed HEP activities related to improving balance, coordination, kinesthetic sense, posture, motor skill, proprioception of core, proximal hip and LE for self care, mobility, lifting, and ambulation/stair navigation      Manual Treatments:  PROM / STM / Oscillations-Mobs:  G-I, II, III, IV (PA's, Inf., Post.)  [] (58722) Provided manual therapy to mobilize LE, proximal hip and/or LS spine soft tissue/joints for the purpose of modulating pain, promoting relaxation,  increasing ROM, reducing/eliminating soft tissue swelling/inflammation/restriction, improving soft tissue extensibility and allowing for proper ROM for normal function with self care, mobility, lifting and ambulation.      Individual Aquatic Therapy:  [x] (17109) Provided verbal and tactile cueing for strengthening, flexibility, ROM using the therapeutic properties of water (buoyancy, resistance) for improvements in core control, mobility and ambulation     Aquatic Group:  [] (61458) Provided intermittent verbal and tactile cueing for strengthening, endurance, flexibility and ROM for 2-4 individuals that do not require one-on one patient contact by the therapist       Land Timed Code Treatment Minutes: 25   Land Total Treatment Minutes: 40     Individual Aquatic Minutes: [address] Minutes:      [] EVAL (LOW) 72760 (typically 20 minutes face-to-face)  [x] EVAL (MOD) 85036 (typically 30 minutes face-to-face)  [] EVAL (HIGH) 84321 (typically 45 minutes face-to-face)  [] RE-EVAL     [] WD(15291) x     [] Dry needle 1 or 2 Muscles (69377)  [] NMR (90138) x     [] Dry needle 3+ Muscles (63719)  [] Manual (45920) x     [] Ultrasound (39066) x  [x] TA (09309) x    2 [] Mech Traction (92704)  [] ES(attended) (36185)     [] ES (un) (16245):   [] Vasopump (37806) [] Ionto (71244)   [x] Aquatic Ind (83650) x 3   [] Aquatic Group (96991) x0  [] Other:    Treatment/Activity Tolerance:  [x] Patient tolerated treatment well [] Patient limited by fatigue  [] Patient limited by pain  [] Patient limited by other medical complications  [] Other:     Prognosis: [] Good [] Fair  [] Poor     ASSESSMENT Pt with history of UE/LE weakness 2/2 cord compression. Will benefit from aquatic therapy   2/10: Pt completed exercises as instructed. Pt notes dec pain after aquatic therapy. Pt would benefit from skilled instruction for UE exer, cervical ROM, balance and posture awareness to dec pain, inc ROM and improve function. 2/15: Pt without c/o throughout treatment. Verbal cues needed to continue to look ahead / neutral neck posture as pt tends to look down into pool during exercises. Pt able to correct with cues. Pt would benefit from continued skilled instruction for posture and exercises to inc strength and posture awareness. 2/17: Pt tolerated exercises however pt notes LB pain continued. Pt demonstrating improved posture today. Pt will be benefit from continued skilled instruction to progress exercises for inc strength for ADL's and posture. 2/22: Pt tolerates exercises with proper technique would benefit from skilled instruction to inc strength and continue improved posture.   2/24: Pt demonstrates improved posture and exercise technique. Pt would benefit from additional skilled instruction to inc strength and functional ADL's.   3/3: Pt reports pain 0/10 after aquatics today. Re-eval next treatment. GOALS:  Patient stated goal: TO be able to move better and not have as much pain  []? Progressing: []? Met: []? Not Met: []? Adjusted     Therapist goals for Patient:   Short Term Goals: To be achieved in: 2 weeks  1. Independent in HEP and progression per patient tolerance, in order to prevent re-injury. []? Progressing: []? Met: []? Not Met: []? Adjusted  2. Patient will have a decrease in pain to facilitate improvement in movement, function, and ADLs as indicated by Functional Deficits. []? Progressing: []? Met: []? Not Met: []? Adjusted     Long Term Goals: To be achieved in: 12 weeks  1. Disability index score of 30% or less for the NDI to assist with reaching prior level of function. []? Progressing: []? Met: []? Not Met: []? Adjusted  2. Patient will demonstrate increased AROM to [facility] of cervical/thoracic spine to allow for proper joint functioning as indicated by patients Functional Deficits. []? Progressing: []? Met: []? Not Met: []? Adjusted  3. Patient will demonstrate an increase in postural awareness and control and activation of  Deep cervical stabilizers to allow for proper functional mobility as indicated by patients Functional Deficits. []? Progressing: []? Met: []? Not Met: []? Adjusted  4. Patient will return to 70% functional activities without increased symptoms or restriction. []? Progressing: []? Met: []? Not Met: []? Adjusted  5. To be able to walk better with better balance. []? Progressing: []? Met: []? Not Met: []?  Adjusted           Patient Requires Follow-up: [x] Yes  [] No    Plan:   [x] Continue per plan of care [] Alter current plan (see comments)  /[] Plan of care initiated [] Hold pending MD visit [] Discharge    Plan for Next Session:   Re-eval next visit. Electronically signed by:  Theresa Horvath, PTA [id number]  /Note: If patient does not return for scheduled/recommended follow up visits, this note will serve as a discharge from care along with the most recent update on progress.

## 2022-03-08 ENCOUNTER — HOSPITAL ENCOUNTER (OUTPATIENT)
Dept: PHYSICAL THERAPY | Age: 42
Setting detail: THERAPIES SERIES
Discharge: HOME | End: 2022-03-08
Payer: OTHER GOVERNMENT

## 2022-03-08 PROCEDURE — 97110 THERAPEUTIC EXERCISES: CPT

## 2022-03-08 PROCEDURE — 97113 AQUATIC THERAPY/EXERCISES: CPT

## 2022-03-08 PROCEDURE — 97530 THERAPEUTIC ACTIVITIES: CPT

## 2022-03-08 NOTE — FLOWSHEET NOTE
back is really bothering me today maybe because of the weather. 2/22: I am now able to sleep in a bed.  2/24: Pt reports neck is about the same. 3/3: Pt reports the testing revealed no blood clots in legs. Pt reports not much change in neck pain after aquatics. 3/8 - Notes that his balance is still an issue, still has some SOB with minimal activity. Has compression stockings, has helped the swelling. Neck pain is on and off.  5-6/10 . Currently 1-2/10 was a 3-4 before the pool     Relevant Medical History:Additional Pertinent Hx: HTN, back surgery 2014/2015  Functional Disability Index:  NDI   2/8/22    21     NDI 3/8/22  21     Pain Scale: 2/10 neck    Easing factors:  Rest.  Provocative factors:  Initial AM/ too much activity     Type: []? Constant       [x]? Intermittent  []? Radiating     []? Localized     []? other:                Numbness/Tingling: through UE's     Occupation/School:  Currently unable to work     Living Status/Prior Level of Function: lives with family. [address] home with railing. Indep with self care. Difficulty putting on pants on right leg, difficulty with sock and shoe on right side as well. Difficulty descending steps. Sleeps in recliner 2/2 neck and back issues.      SUBJECTIVE: Pt reports weakness in his arms at times with NT, also bilateral hands. Notes weakness in both legs R>L      OBJECTIVE:   Palpation: no specific TTP noted. Has mild pitting edema moreso on right than left.     Functional Mobility/Transfers: Mod I - needs arms of chair for leverage    UE -  ROM  WFL  Strength  Grossly 4-/5 throughout     LE ROM -  WFL mario LE's, increased effort to initiate movement RLE  LE strength - grossly 4/5 left,  4-/5 right.     Balance -  Feet together - wfl ,  Modified tandem min sway. Tandem  R/L increased sway with R/L  - unable   3/8 - right in front tandem  30 sec.   Left in front tandem 15 sec, inc sway      Land-based Visits Exercises/Activities:  Therapeutic Ex (54575)  Min:  15 Resistance/Repetitions Notes   Nustep add    HSS     GSS          Fitter          Leg Press     Knee extension     Knee flexion                 Therapeutic Activity (87274) Min: 25          BOSU add         Side stepping          Stance with one foot on step          Step tap          gumdrops          Tandem gait                              Review of LE swelling and options to manage    REview of gait issues with balance and options to manage    REview of SOB -  02 before and after acctivity       Compression stockings    Doing much better              Rest O2 95%, HR 95  Post 3 min walking  02 97%     NMR re-education (58618) Min:           Quadruped  Extremity lift add    Opp UE/LE          Tall kneeliong add    1/2 kneeling               Manual Intervention (48502)  Min:                    Modalities  Min:               Aquatic Visits Exercises/Activities:  Aquatic Abbreviation Key  B= Belt DB= Dumbells T= Theratube   G=Gloves N= Noodles W= Weights   P= Paddles WW=Water Walker K= Kickboard        Transfers:   steps with bilat handrails WRIST flex/ext, pron/sup    % Immersion: 75-80% FINGER flex/ext          Ambulation:   Exercises UE:      Forwards 4+1 with gentle UE mvt Shoulder Shrugs 10    Lateral 2 with abd/add UE mvt Shoulder circles 10    Marching   2 Scapular Retraction  10    Retro   Rolling  10    [place]as  Push Downs 10   Multifidus walkouts w/paddle  IR/ER 10     Punching 10   Stretching:  Rowing 10   Gastroc/Soleus   Elbow Flex/Ext 10   Hamstring  30 sec x 2 bilat Shldr Flex/Ext  10   Knee flex stretch   Shldr Abd/Add 10   Piriformis   Horiz Abd/Add  10   Hip flexor    Arm Circles  10   SKTC   PNF Diagonals 10   DKTC  UE oscillations f/b, s/s 10 sec, 10 sec   ITB   Wall Push-ups 10   Quad  Figure 8's    Mid back   Buoy pull/push downs    UT  Tband rows/push Red 15/15   Rhom  Tband lats/ biceps/triceps Red 15/15/15   Post Shoulder  Lumbar Rot w/ paddles    Pec   Small ball pull downs diagonals 15     2# Small ball pool to deck 15      Cervical:       AROM Flex 10      AROM Extension     LEs exercises:   AROM Side Bending    Marching  10 AROM Rotation 10/10 R/L   Heel Raises  10 Chin tucks    Toe Raises  10 Chin nods     Squats  10      Hamstring Curls  10      Hip Flexion  10 Balance: Hip Abduction 10 SLS    Hip Circles 10 Tandem stance    TA set  NBOS eyes open    Glut Set  NBOS eyes closed    Hip Extension  Hand to Opposite Knee 10   Hip Adduction    Box Step     Hip IR   Noodle Stance     Hip ER  Stop/Go Gait    Fig 8's  Switch Gait      Foot on step 30 sec R/L without LOB         Seated:  Functional:    Ankle Pumps  Step up forward 10 R/L   Ankle circles  Step up lateral    Knee flex & ext  Step down    Hip Abd & Adduction  [place] squats    Bicycle   Crate Lifts 10   Add Set with ball  Lunges forward    LX stab with med ball throws  Lunges lateral    Ankle INV  Lunges retro    Ankle EV  Lower ab curl with noodle      Upper ab curl with ball      Med ball straight lifts      Med ball diagonal lifts      Foot onto/off of step          Noodle:      Leg Press  Deep Water:    Noodle hang at wall  Jog    Noodle hang deep water  Jumping Jacks    Noodle Bicycle  Heel to toe      Hand to opposite knee      Cross country skier      Rocking Horse    2/10: Discussed with patient in length sitting with 1-2 pillows underneath arms in order to promote proper sitting posture as well as put any book, tablet, reading material at face level. Pt verbalized understanding. Other Therapeutic Activities:  Pt was educated on PT POC, Diagnosis, Prognosis, pathomechanics as well as frequency and duration of scheduling future physical therapy appointments. Time was also taken on this day to answer all patient questions and participation in PT. Reviewed appointment policy in detail with patient and patient verbalized understanding.      Home Exercise Program:  Patient was instructed in the following for HEP:     . Patient verbalized/demonstrated understanding and was issued written handout. Charges: Therapeutic Exercise and NMR EXR  [] (10557) Provided verbal/tactile cueing for activities related to strengthening, flexibility, endurance, ROM for improvements in LE, proximal hip, and core control with self care, mobility, lifting, ambulation.  [] (84823) Provided verbal/tactile cueing for activities related to improving balance, coordination, kinesthetic sense, posture, motor skill, proprioception  to assist with LE, proximal hip, and core control in self care, mobility, lifting, ambulation and eccentric single leg control.      NMR and Therapeutic Activities:    [] (22711 or 78445) Provided verbal/tactile cueing for activities related to improving balance, coordination, kinesthetic sense, posture, motor skill, proprioception and motor activation to allow for proper function of core, proximal hip and LE with self care and ADLs and functional mobility.   [] (80039) Gait Re-education- Provided training and instruction to the patient for proper LE, core and proximal hip recruitment and positioning and eccentric body weight control with ambulation re-education including up and down stairs     Home Exercise Program:    [x] (45385) Reviewed/Progressed HEP activities related to strengthening, flexibility, endurance, ROM of core, proximal hip and LE for functional self-care, mobility, lifting and ambulation/stair navigation   [] (39848)Reviewed/Progressed HEP activities related to improving balance, coordination, kinesthetic sense, posture, motor skill, proprioception of core, proximal hip and LE for self care, mobility, lifting, and ambulation/stair navigation      Manual Treatments:  PROM / STM / Oscillations-Mobs:  G-I, II, III, IV (PA's, Inf., Post.)  [] (25518) Provided manual therapy to mobilize LE, proximal hip and/or LS spine soft tissue/joints for the purpose of modulating pain, promoting relaxation, increasing ROM, reducing/eliminating soft tissue swelling/inflammation/restriction, improving soft tissue extensibility and allowing for proper ROM for normal function with self care, mobility, lifting and ambulation. Individual Aquatic Therapy:  [x] (70096) Provided verbal and tactile cueing for strengthening, flexibility, ROM using the therapeutic properties of water (buoyancy, resistance) for improvements in core control, mobility and ambulation     Aquatic Group:  [] (14556) Provided intermittent verbal and tactile cueing for strengthening, endurance, flexibility and ROM for 2-4 individuals that do not require one-on one patient contact by the therapist       Land Timed Code Treatment Minutes: 40   Land Total Treatment Minutes: 40     Individual Aquatic Minutes: 45   Aquatic Group Minutes:      [] EVAL (LOW) 46305 (typically 20 minutes face-to-face)  [x] EVAL (MOD) 39240 (typically 30 minutes face-to-face)  [] EVAL (HIGH) 33566 (typically 45 minutes face-to-face)  [] RE-EVAL     [] LN(77243) x     [] Dry needle 1 or 2 Muscles (78723)  [] NMR (37317) x     [] Dry needle 3+ Muscles (97810)  [] Manual (96167) x     [] Ultrasound (66352) x  [x] TA (44294) x    2 [] Mech Traction (65811)  [] ES(attended) (77070)     [] ES (un) (75875):   [] Vasopump (81342) [] Ionto (68919)   [x] Aquatic Ind (31932) x 3   [] Aquatic Group (04466) x0  [] Other:    Treatment/Activity Tolerance:  [x] Patient tolerated treatment well [] Patient limited by fatigue  [] Patient limited by pain  [] Patient limited by other medical complications  [] Other:     Prognosis: [] Good [] Fair  [] Poor     ASSESSMENT Pt with history of UE/LE weakness 2/2 cord compression. Will benefit from aquatic therapy   2/10: Pt completed exercises as instructed. Pt notes dec pain after aquatic therapy. Pt would benefit from skilled instruction for UE exer, cervical ROM, balance and posture awareness to dec pain, inc ROM and improve function.   2/15: Pt without c/o throughout treatment. Verbal cues needed to continue to look ahead / neutral neck posture as pt tends to look down into pool during exercises. Pt able to correct with cues. Pt would benefit from continued skilled instruction for posture and exercises to inc strength and posture awareness. 2/17: Pt tolerated exercises however pt notes LB pain continued. Pt demonstrating improved posture today. Pt will be benefit from continued skilled instruction to progress exercises for inc strength for ADL's and posture. 2/22: Pt tolerates exercises with proper technique would benefit from skilled instruction to inc strength and continue improved posture. 2/24: Pt demonstrates improved posture and exercise technique. Pt would benefit from additional skilled instruction to inc strength and functional ADL's.   3/3: Pt reports pain 0/10 after aquatics today. Re-eval next treatment. 3/8: Pt notes dec pain after aquatic therapy. GOALS:  Patient stated goal: TO be able to move better and not have as much pain  []? Progressing: []? Met: []? Not Met: []? Adjusted     Therapist goals for Patient:   Short Term Goals: To be achieved in: 2 weeks  1. Independent in HEP and progression per patient tolerance, in order to prevent re-injury. [x]? Progressing: []? Met: []? Not Met: []? Adjusted  2. Patient will have a decrease in pain to facilitate improvement in movement, function, and ADLs as indicated by Functional Deficits. [x]? Progressing: []? Met: []? Not Met: []? Adjusted     Long Term Goals: To be achieved in: 12 weeks  1. Disability index score of 30% or less for the NDI to assist with reaching prior level of function. []? Progressing: []? Met: []? Not Met: []? Adjusted  2. Patient will demonstrate increased AROM to [facility] of cervical/thoracic spine to allow for proper joint functioning as indicated by patients Functional Deficits. []? Progressing: []? Met: []? Not Met: []? Adjusted  3.  Patient will demonstrate an increase in postural awareness and control and activation of  Deep cervical stabilizers to allow for proper functional mobility as indicated by patients Functional Deficits. []? Progressing: []? Met: []? Not Met: []? Adjusted  4. Patient will return to 70% functional activities without increased symptoms or restriction. []? Progressing: []? Met: []? Not Met: []? Adjusted  5. To be able to walk better with better balance. []? Progressing: []? Met: []? Not Met: []? Adjusted           Patient Requires Follow-up: [x] Yes  [] No    Plan: Progress to land based program  [x] Continue per plan of care [] Alter current plan (see comments)  /[] Plan of care initiated [] Hold pending MD visit [] Discharge    Plan for Next Session:   Begin land PT per PT plan. Electronically signed by:  Bartolome Felix PT  [address] PTA  [phone]      /Note: If patient does not return for scheduled/recommended follow up visits, this note will serve as a discharge from care along with the most recent update on progress.

## 2022-03-11 ENCOUNTER — HOSPITAL ENCOUNTER (OUTPATIENT)
Dept: PHYSICAL THERAPY | Age: 42
Setting detail: THERAPIES SERIES
Discharge: HOME | End: 2022-03-11
Payer: OTHER GOVERNMENT

## 2022-03-11 PROCEDURE — 97112 NEUROMUSCULAR REEDUCATION: CPT

## 2022-03-11 PROCEDURE — 97110 THERAPEUTIC EXERCISES: CPT

## 2022-03-11 PROCEDURE — 97530 THERAPEUTIC ACTIVITIES: CPT

## 2022-03-11 NOTE — FLOWSHEET NOTE
[address] [facility],[address], [facility]  [address] [facility]  Phone: [phone]   Fax:     [phone]      Physical Therapy Daily/Aquatic Flow Sheet   Date:  3/11/2022    Patient Name:  Shay Reyes    :  1980  MRN: [id number]    Restrictions/Precautions:   None noted  Pertinent Medical History:Additional Pertinent Hx: HTN, back surgery     Medical/Treatment Diagnosis Information:   · Diagnosis: M54.2 (ICD-10-CM) - Neck pain  · Treatment Diagnosis: Decreased functional mobility 2/2 neck pain. Insurance/Certification information:  PT Insurance Information: Kunal  Physician Information:  Referring Practitioner: Isaac Bo of care signed (Y/N): sent    Date of Patient follow up with Physician:      Progress Report: []  Yes  [x]  No     Date Range for reporting period:  Beginning:  3/11/2022  Ending:      Progress report due (10 Rx/or 30 days whichever is less): 3/8     Recertification due (POC duration/ or 90 days whichever is less):      Visit # POC/Insurance Allowable Auth Needed   9 30 []Yes   [x]No     Latex Allergy:  [x]NO      []YES  Preferred Language for Healthcare:   [x]English       []Other:    History of Present Condition   21     POSTERIOR CERVICAL LAMINIOPLASTY C4-5-6-7 ; ANTERIOR CERVICAL DISCECTOMY FUSION C4-5, C5-6 , C6-7 ASPIRATE BONE MARROW RIGHT ILIAC CREST AND C6 CORPECTOMY            MD note 22  The patient is now approximately six to seven months post anterior-posterior fusion. His sequential scans show marked improvement in the cervical stenosis, cord compression and swelling. The swelling in his cord is improving. His subarachnoid space is now circumferentially present.           SUBJECTIVE: Pt reports weakness in his arms at times with NT, also bilateral hands. Notes weakness in both legs R>L  3/8 - Notes that his balance is still an issue, still has some SOB with minimal activity.  Has compression stockings, has helped the swelling. Neck pain is on and off.  5-6/10 . Currently 1-2/10 was a 3-4 before the pool     Relevant Medical History:Additional Pertinent Hx: HTN, back surgery 2014/2015  Functional Disability Index:  NDI   2/8/22    21     NDI 3/8/22  21     Pain Scale: 2/10 neck    Easing factors:  Rest.  Provocative factors:  Initial AM/ too much activity     Type: []? Constant       [x]? Intermittent  []? Radiating     []? Localized     []? other:                Numbness/Tingling: through UE's     Occupation/School:  Currently unable to work     Living Status/Prior Level of Function: lives with family. [address] home with railing. Indep with self care. Difficulty putting on pants on right leg, difficulty with sock and shoe on right side as well. Difficulty descending steps. Sleeps in recliner 2/2 neck and back issues.      SUBJECTIVE: Pt reports weakness in his arms at times with NT, also bilateral hands. Notes weakness in both legs R>L      OBJECTIVE:   Palpation: no specific TTP noted. Has mild pitting edema moreso on right than left.     Functional Mobility/Transfers: Mod I - needs arms of chair for leverage    UE -  ROM  WFL  Strength  Grossly 4-/5 throughout     LE ROM -  WFL mario LE's, increased effort to initiate movement RLE  LE strength - grossly 4/5 left,  4-/5 right.     Balance -  Feet together - wfl ,  Modified tandem min sway. Tandem  R/L increased sway with R/L  - unable   3/8 - right in front tandem  30 sec.   Left in front tandem 15 sec, inc sway      Land-based Visits Exercises/Activities:  Therapeutic Ex (33837)  Min: 20   Resistance/Repetitions Notes   Nustep seat 16  5 min  Level 3    HSS 30 sec x 2    GSS 30 sec x 2         Fitter          Leg Press 30# 2 x 10 R  40# 2 x 19 L    Knee extension 5# 2 x 10 R  11# 2 x 10 L    Knee flexion                 Therapeutic Activity (46123) Min: 10          BOSU add         Side stepping          Stance with one foot on step          Step tap gumdrops          Tandem gait          Tandem stance R/L 30 sec x 2         Step ups  6\"  Steps lat  Step downs ADD         NMR re-education (34134) Min:  15          Quadruped  Extremity lift X 10 Difficulty with Right stability   Opp UE/LE          Tall kneeliong     1/2 kneeling 2 min each Difficulty with R        Bridge with ball Arms crossed over chest  X 10 Stabilized with UE                           Other Therapeutic Activities:  Pt was educated on PT POC, Diagnosis, Prognosis, pathomechanics as well as frequency and duration of scheduling future physical therapy appointments. Time was also taken on this day to answer all patient questions and participation in PT. Reviewed appointment policy in detail with patient and patient verbalized understanding. Home Exercise Program:  Patient was instructed in the following for HEP:     . Patient verbalized/demonstrated understanding and was issued written handout. Charges: Therapeutic Exercise and NMR EXR  [] (02102) Provided verbal/tactile cueing for activities related to strengthening, flexibility, endurance, ROM for improvements in LE, proximal hip, and core control with self care, mobility, lifting, ambulation.  [] (30587) Provided verbal/tactile cueing for activities related to improving balance, coordination, kinesthetic sense, posture, motor skill, proprioception  to assist with LE, proximal hip, and core control in self care, mobility, lifting, ambulation and eccentric single leg control.      NMR and Therapeutic Activities:    [] (33648 or 01690) Provided verbal/tactile cueing for activities related to improving balance, coordination, kinesthetic sense, posture, motor skill, proprioception and motor activation to allow for proper function of core, proximal hip and LE with self care and ADLs and functional mobility.   [] (51265) Gait Re-education- Provided training and instruction to the patient for proper LE, core and proximal hip recruitment and (22024)  [] Manual (00708) x     [] Ultrasound (29652) x  [x] TA (75563) x    2 [] Mech Traction (97507)  [] ES(attended) (05548)     [] ES (un) (77324):   [] Vasopump (07864) [] Ionto (92193)   [x] Aquatic Ind (10368) x 3   [] Aquatic Group (18395) x0  [] Other:    Treatment/Activity Tolerance:  [x] Patient tolerated treatment well [] Patient limited by fatigue  [] Patient limited by pain  [] Patient limited by other medical complications  [] Other:     Prognosis: [] Good [] Fair  [] Poor     ASSESSMENT Pt with history of UE/LE weakness 2/2 cord compression. Will benefit from aquatic therapy   2/10: Pt completed exercises as instructed. Pt notes dec pain after aquatic therapy. Pt would benefit from skilled instruction for UE exer, cervical ROM, balance and posture awareness to dec pain, inc ROM and improve function. 2/15: Pt without c/o throughout treatment. Verbal cues needed to continue to look ahead / neutral neck posture as pt tends to look down into pool during exercises. Pt able to correct with cues. Pt would benefit from continued skilled instruction for posture and exercises to inc strength and posture awareness. 2/17: Pt tolerated exercises however pt notes LB pain continued. Pt demonstrating improved posture today. Pt will be benefit from continued skilled instruction to progress exercises for inc strength for ADL's and posture. 2/22: Pt tolerates exercises with proper technique would benefit from skilled instruction to inc strength and continue improved posture. 2/24: Pt demonstrates improved posture and exercise technique. Pt would benefit from additional skilled instruction to inc strength and functional ADL's.   3/3: Pt reports pain 0/10 after aquatics today. Re-eval next treatment. 3/8: Pt notes dec pain after aquatic therapy. GOALS:  Patient stated goal: TO be able to move better and not have as much pain  []? Progressing: []? Met: []? Not Met: []?  Adjusted     Therapist goals for Patient:   Short Term Goals: To be achieved in: 2 weeks  1. Independent in HEP and progression per patient tolerance, in order to prevent re-injury. [x]? Progressing: []? Met: []? Not Met: []? Adjusted  2. Patient will have a decrease in pain to facilitate improvement in movement, function, and ADLs as indicated by Functional Deficits. [x]? Progressing: []? Met: []? Not Met: []? Adjusted     Long Term Goals: To be achieved in: 12 weeks  1. Disability index score of 30% or less for the NDI to assist with reaching prior level of function. []? Progressing: []? Met: []? Not Met: []? Adjusted  2. Patient will demonstrate increased AROM to [facility] of cervical/thoracic spine to allow for proper joint functioning as indicated by patients Functional Deficits. []? Progressing: []? Met: []? Not Met: []? Adjusted  3. Patient will demonstrate an increase in postural awareness and control and activation of  Deep cervical stabilizers to allow for proper functional mobility as indicated by patients Functional Deficits. []? Progressing: []? Met: []? Not Met: []? Adjusted  4. Patient will return to 70% functional activities without increased symptoms or restriction. []? Progressing: []? Met: []? Not Met: []? Adjusted  5. To be able to walk better with better balance. []? Progressing: []? Met: []? Not Met: []? Adjusted           Patient Requires Follow-up: [x] Yes  [] No    Plan: Progress to land based program  [x] Continue per plan of care [] Alter current plan (see comments)  /[] Plan of care initiated [] Hold pending MD visit [] Discharge    Plan for Next Session:   Begin land PT per PT plan. Electronically signed by:  Beck Mak PT  [address] PTA  [phone]      /Note: If patient does not return for scheduled/recommended follow up visits, this note will serve as a discharge from care along with the most recent update on progress.

## 2022-03-15 ENCOUNTER — HOSPITAL ENCOUNTER (OUTPATIENT)
Dept: PHYSICAL THERAPY | Age: 42
Setting detail: THERAPIES SERIES
Discharge: HOME | End: 2022-03-15
Payer: OTHER GOVERNMENT

## 2022-03-15 NOTE — FLOWSHEET NOTE
[address] [facility] [place]  [address] [facility]  Phone: [phone]   Fax:     [phone]    Physical Therapy  Cancellation/No-show Note  Patient Name:  Deangelo De Paz  :  1980   Date:  3/15/2022  Cancelled visits to date: 1  No-shows to date: 0    Patient status for today's appointment patient:  [x]  Cancelled  []  Rescheduled appointment  []  No-show     Reason given by patient:  []  Patient ill  []  Conflicting appointment  []  No transportation    []  Conflict with work  []  No reason given  [x]  Other:     Comments:  Increased back pain    Phone call information:   []  Phone call made today to patient at _ time at number provided:      []  Patient answered, conversation as follows:    []  Patient did not answer, message left as follows:  []  Phone call not made today    Electronically signed by:  Serenity Wong #[id number]

## 2022-03-17 ENCOUNTER — APPOINTMENT (OUTPATIENT)
Dept: PHYSICAL THERAPY | Age: 42
End: 2022-03-17
Payer: OTHER GOVERNMENT

## 2022-03-22 ENCOUNTER — HOSPITAL ENCOUNTER (OUTPATIENT)
Dept: PHYSICAL THERAPY | Age: 42
Setting detail: THERAPIES SERIES
Discharge: HOME | End: 2022-03-22
Payer: OTHER GOVERNMENT

## 2022-03-22 PROCEDURE — 97530 THERAPEUTIC ACTIVITIES: CPT | Performed by: CHIROPRACTOR

## 2022-03-22 PROCEDURE — 97110 THERAPEUTIC EXERCISES: CPT | Performed by: CHIROPRACTOR

## 2022-03-22 NOTE — FLOWSHEET NOTE
stockings, has helped the swelling. Neck pain is on and off.  5-6/10 . Currently 1-2/10 was a 3-4 before the pool     Relevant Medical History:Additional Pertinent Hx: HTN, back surgery 2014/2015  Functional Disability Index:  NDI   2/8/22    21     NDI 3/8/22  21     Pain Scale: 3/10 neck    Easing factors:  Rest.  Provocative factors:  Initial AM/ too much activity     Type: []? Constant       [x]? Intermittent  []? Radiating     []? Localized     []? other:                Numbness/Tingling: through UE's     Occupation/School:  Currently unable to work     Living Status/Prior Level of Function: lives with family. [address] home with railing. Indep with self care. Difficulty putting on pants on right leg, difficulty with sock and shoe on right side as well. Difficulty descending steps. Sleeps in recliner 2/2 neck and back issues.      SUBJECTIVE: Pt reports weakness in his arms at times with NT, also bilateral hands. Notes weakness in both legs R>L                          3/22/22 - States he had significant increased LB / LE pain after last appt ( 1st land therapy)    OBJECTIVE:   Palpation: no specific TTP noted. Has mild pitting edema moreso on right than left.     Functional Mobility/Transfers: Mod I - needs arms of chair for leverage    UE -  ROM  WFL  Strength  Grossly 4-/5 throughout     LE ROM -  WFL mario LE's, increased effort to initiate movement RLE  LE strength - grossly 4/5 left,  4-/5 right.     Balance -  Feet together - wfl ,  Modified tandem min sway. Tandem  R/L increased sway with R/L  - unable   3/8 - right in front tandem  30 sec.   Left in front tandem 15 sec, inc sway      Land-based Visits Exercises/Activities:  Therapeutic Ex (49648)  Min: 20   Resistance/Repetitions Notes   Nustep seat 16                           (UE only) 5 min  Level 2    HSS 30 sec x 2    GSS 30 sec x 2         Fitter          Leg Press               #6 30#  2 x 10 B      Knee extension 11# -  2x10   B    Knee flexion 11# - ADLs and functional mobility.   [] (93597) Gait Re-education- Provided training and instruction to the patient for proper LE, core and proximal hip recruitment and positioning and eccentric body weight control with ambulation re-education including up and down stairs     Home Exercise Program:    [x] (62617) Reviewed/Progressed HEP activities related to strengthening, flexibility, endurance, ROM of core, proximal hip and LE for functional self-care, mobility, lifting and ambulation/stair navigation   [] (03147)Reviewed/Progressed HEP activities related to improving balance, coordination, kinesthetic sense, posture, motor skill, proprioception of core, proximal hip and LE for self care, mobility, lifting, and ambulation/stair navigation      Manual Treatments:  PROM / STM / Oscillations-Mobs:  G-I, II, III, IV (PA's, Inf., Post.)  [] (16375) Provided manual therapy to mobilize LE, proximal hip and/or LS spine soft tissue/joints for the purpose of modulating pain, promoting relaxation,  increasing ROM, reducing/eliminating soft tissue swelling/inflammation/restriction, improving soft tissue extensibility and allowing for proper ROM for normal function with self care, mobility, lifting and ambulation.      Individual Aquatic Therapy:  [x] (99301) Provided verbal and tactile cueing for strengthening, flexibility, ROM using the therapeutic properties of water (buoyancy, resistance) for improvements in core control, mobility and ambulation     Aquatic Group:  [] (61379) Provided intermittent verbal and tactile cueing for strengthening, endurance, flexibility and ROM for 2-4 individuals that do not require one-on one patient contact by the therapist       Land Timed Code Treatment Minutes: 40   Land Total Treatment Minutes: 40     Individual Aquatic Minutes: 45   Aquatic Group Minutes:      [] EVAL (LOW) 94509 (typically 20 minutes face-to-face)  [x] EVAL (MOD) 50805 (typically 30 minutes face-to-face)  [] EVAL (HIGH) 99251 (typically 45 minutes face-to-face)  [] RE-EVAL     [x] KQ(26178) x  2  [] Dry needle 1 or 2 Muscles (54985)  [] NMR (10903) x     [] Dry needle 3+ Muscles (68890)  [] Manual (66342) x     [] Ultrasound (73640) x  [x] TA (24176) x     [] Mech Traction (42345)  [] ES(attended) (61649)     [] ES (un) (63643):   [] Vasopump (85851) [] Ionto (29511)   [] Aquatic Ind (05199) x 3   [] Aquatic Group (55848) x0  [] Other:    Treatment/Activity Tolerance:  [x] Patient tolerated treatment well [] Patient limited by fatigue  [] Patient limited by pain  [] Patient limited by other medical complications  [] Other:     Prognosis: [] Good [] Fair  [] Poor     ASSESSMENT Pt with history of UE/LE weakness 2/2 cord compression. Will benefit from aquatic therapy   2/10: Pt completed exercises as instructed. Pt notes dec pain after aquatic therapy. Pt would benefit from skilled instruction for UE exer, cervical ROM, balance and posture awareness to dec pain, inc ROM and improve function. 2/15: Pt without c/o throughout treatment. Verbal cues needed to continue to look ahead / neutral neck posture as pt tends to look down into pool during exercises. Pt able to correct with cues. Pt would benefit from continued skilled instruction for posture and exercises to inc strength and posture awareness. 2/17: Pt tolerated exercises however pt notes LB pain continued. Pt demonstrating improved posture today. Pt will be benefit from continued skilled instruction to progress exercises for inc strength for ADL's and posture. 2/22: Pt tolerates exercises with proper technique would benefit from skilled instruction to inc strength and continue improved posture. 2/24: Pt demonstrates improved posture and exercise technique. Pt would benefit from additional skilled instruction to inc strength and functional ADL's.   3/3: Pt reports pain 0/10 after aquatics today. Re-eval next treatment.   3/8: Pt notes dec pain after aquatic therapy. GOALS:  Patient stated goal: TO be able to move better and not have as much pain  []? Progressing: []? Met: []? Not Met: []? Adjusted     Therapist goals for Patient:   Short Term Goals: To be achieved in: 2 weeks  1. Independent in HEP and progression per patient tolerance, in order to prevent re-injury. [x]? Progressing: []? Met: []? Not Met: []? Adjusted  2. Patient will have a decrease in pain to facilitate improvement in movement, function, and ADLs as indicated by Functional Deficits. [x]? Progressing: []? Met: []? Not Met: []? Adjusted     Long Term Goals: To be achieved in: 12 weeks  1. Disability index score of 30% or less for the NDI to assist with reaching prior level of function. []? Progressing: []? Met: []? Not Met: []? Adjusted  2. Patient will demonstrate increased AROM to [facility] of cervical/thoracic spine to allow for proper joint functioning as indicated by patients Functional Deficits. []? Progressing: []? Met: []? Not Met: []? Adjusted  3. Patient will demonstrate an increase in postural awareness and control and activation of  Deep cervical stabilizers to allow for proper functional mobility as indicated by patients Functional Deficits. []? Progressing: []? Met: []? Not Met: []? Adjusted  4. Patient will return to 70% functional activities without increased symptoms or restriction. []? Progressing: []? Met: []? Not Met: []? Adjusted  5. To be able to walk better with better balance. []? Progressing: []? Met: []? Not Met: []?  Adjusted           Patient Requires Follow-up: [x] Yes  [] No    Plan: Progress to land based program  [x] Continue per plan of care [] Alter current plan (see comments)  /[] Plan of care initiated [] Hold pending MD visit [] Discharge    Plan for Next Session:   Increase land PT as tolerated  Electronically signed by:  Ana Tinoco  [id number]             Allie Dixon: If patient does not return for scheduled/recommended follow up visits, this note will serve as a discharge from care along with the most recent update on progress.

## 2022-03-25 ENCOUNTER — HOSPITAL ENCOUNTER (OUTPATIENT)
Dept: PHYSICAL THERAPY | Age: 42
Setting detail: THERAPIES SERIES
Discharge: HOME | End: 2022-03-25
Payer: OTHER GOVERNMENT

## 2022-03-25 PROCEDURE — 97112 NEUROMUSCULAR REEDUCATION: CPT

## 2022-03-25 PROCEDURE — 97110 THERAPEUTIC EXERCISES: CPT

## 2022-03-25 PROCEDURE — 97530 THERAPEUTIC ACTIVITIES: CPT

## 2022-03-25 NOTE — FLOWSHEET NOTE
[address], [facility]  [address] [facility] [address] [place], [facility]  Phone: [phone]   Fax:     [phone]      Physical Therapy Daily/Aquatic Flow Sheet   Date:  3/25/2022    Patient Name:  Katie Duncan    :  1980  MRN: [id number]    Restrictions/Precautions:   None noted  Pertinent Medical History:Additional Pertinent Hx: HTN, back surgery     Medical/Treatment Diagnosis Information:   · Diagnosis: M54.2 (ICD-10-CM) - Neck pain  · Treatment Diagnosis: Decreased functional mobility 2/2 neck pain. Insurance/Certification information:  PT Insurance Information: Kunal  Physician Information:  Referring Practitioner: Marylen Blacksmith of care signed (Y/N): sent    Date of Patient follow up with Physician:      Progress Report: []  Yes  [x]  No     Date Range for reporting period:  Beginning:  3/25/2022  Ending:      Progress report due (10 Rx/or 30 days whichever is less): 3/8     Recertification due (POC duration/ or 90 days whichever is less):      Visit # POC/Insurance Allowable Auth Needed   10 30 []Yes   [x]No     Latex Allergy:  [x]NO      []YES  Preferred Language for Healthcare:   [x]English       []Other:    History of Present Condition   21     POSTERIOR CERVICAL LAMINIOPLASTY C4-5-6-7 ; ANTERIOR CERVICAL DISCECTOMY FUSION C4-5, C5-6 , C6-7 ASPIRATE BONE MARROW RIGHT ILIAC CREST AND C6 CORPECTOMY            MD note 22  The patient is now approximately six to seven months post anterior-posterior fusion. His sequential scans show marked improvement in the cervical stenosis, cord compression and swelling. The swelling in his cord is improving. His subarachnoid space is now circumferentially present.           SUBJECTIVE: Pt reports weakness in his arms at times with NT, also bilateral hands. Notes weakness in both legs R>L  3/8 - Notes that his balance is still an issue, still has some SOB with minimal activity.  Has compression stockings, has helped the swelling. Neck pain is on and off.  5-6/10 . Currently 1-2/10 was a 3-4 before the pool     Relevant Medical History:Additional Pertinent Hx: HTN, back surgery 2014/2015  Functional Disability Index:  NDI   2/8/22 21     NDI 3/8/22  21    [place] Balance       ITEM DESCRIPTION SCORE (0-4)  1. Sitting to standing ________  2. Standing unsupported ________  3. Sitting unsupported ________  4. Standing to sitting ________  5. Transfers ________  6. Standing with eyes closed ________  7. Standing with feet together ________  8. Reaching forward with outstretched arm ________  9. Retrieving object from floor ________  10. Turning to look behind ________  11. Turning 360 degrees ________  12. Placing alternate foot on stool ________  13. Standing with one foot in front ________  14. Standing on one foot ________  Total _______         Pain Scale: 3/10 neck    Easing factors:  Rest.  Provocative factors:  Initial AM/ too much activity     Type: []? Constant       [x]? Intermittent  []? Radiating     []? Localized     []? other:                Numbness/Tingling: through UE's     Occupation/School:  Currently unable to work     Living Status/Prior Level of Function: lives with family. [address] home with railing. Indep with self care. Difficulty putting on pants on right leg, difficulty with sock and shoe on right side as well. Difficulty descending steps. Sleeps in recliner 2/2 neck and back issues.      SUBJECTIVE: Pt reports weakness in his arms at times with NT, also bilateral hands. Notes weakness in both legs R>L                          3/22/22 - States he had significant increased LB / LE pain after last appt ( 1st land therapy)    OBJECTIVE:   Palpation: no specific TTP noted. Has mild pitting edema moreso on right than left.     Functional Mobility/Transfers:  Mod I - needs arms of chair for leverage    UE -  ROM  WFL  Strength  Grossly 4-/5 throughout     LE ROM -  WFL mario LE's, increased effort to initiate movement RLE  LE strength - grossly 4/5 left,  4-/5 right.     Balance -  Feet together - wfl ,  Modified tandem min sway. Tandem  R/L increased sway with R/L  - unable   3/8 - right in front tandem  30 sec. Left in front tandem 15 sec, inc sway  3/25 narrow ZO  wfl  -  R/L sec tandem 30 sec  SLS right 23 sec        Left  30 sec      Land-based Visits Exercises/Activities:  Therapeutic Ex (94566)  Min: 20   Resistance/Repetitions Notes   Nustep seat 16                           (UE only) 5 min  Level 2    HSS 30 sec x 2    GSS 30 sec x 2         Leg Press               #6 30#  2 x 10 B   Separate into R/L   Knee extension 11# -  2x10   B Do R/L sep   Knee flexion 11# -  2x10   B                Therapeutic Activity (88396) Min: 30          BOSU add    Rocker board   F/b, s/s 1 min each    Side stepping 2 lengths    Step tap 10         gumdrops     Tandem gait 4 lengths         Tandem stance R/L 30 sec x 2    Tandem on 4\" step 30 sec x 2       Step ups 8\"     2 x 10 R/L opposite hand hold    Fitness center Review of seated elliptical, bosu and pool Pt to being aquatics 2x/week   Walking on mat ADD NMR re-education (47171) Min:  5          Quadruped  opp  Extremity lift X 5 each,  3 second hold         1/2 kneeling 2 min each Difficulty with R        Bridge with ball Arms crossed over chest  X 10 Stabilized with UE            Other Therapeutic Activities:  Pt was educated on PT POC, Diagnosis, Prognosis, pathomechanics as well as frequency and duration of scheduling future physical therapy appointments. Time was also taken on this day to answer all patient questions and participation in PT. Reviewed appointment policy in detail with patient and patient verbalized understanding. Home Exercise Program:  Patient was instructed in the following for HEP:     . Patient verbalized/demonstrated understanding and was issued written handout. Charges:   Therapeutic Exercise and NMR EXR  [] (41327) Provided verbal/tactile cueing for activities related to strengthening, flexibility, endurance, ROM for improvements in LE, proximal hip, and core control with self care, mobility, lifting, ambulation.  [] (03256) Provided verbal/tactile cueing for activities related to improving balance, coordination, kinesthetic sense, posture, motor skill, proprioception  to assist with LE, proximal hip, and core control in self care, mobility, lifting, ambulation and eccentric single leg control.      NMR and Therapeutic Activities:    [] (43247 or 87612) Provided verbal/tactile cueing for activities related to improving balance, coordination, kinesthetic sense, posture, motor skill, proprioception and motor activation to allow for proper function of core, proximal hip and LE with self care and ADLs and functional mobility.   [] (82691) Gait Re-education- Provided training and instruction to the patient for proper LE, core and proximal hip recruitment and positioning and eccentric body weight control with ambulation re-education including up and down stairs     Home Exercise Program:    [x] (85777) Reviewed/Progressed HEP activities related to strengthening, flexibility, endurance, ROM of core, proximal hip and LE for functional self-care, mobility, lifting and ambulation/stair navigation   [] (78192)Reviewed/Progressed HEP activities related to improving balance, coordination, kinesthetic sense, posture, motor skill, proprioception of core, proximal hip and LE for self care, mobility, lifting, and ambulation/stair navigation      Manual Treatments:  PROM / STM / Oscillations-Mobs:  G-I, II, III, IV (PA's, Inf., Post.)  [] (14459) Provided manual therapy to mobilize LE, proximal hip and/or LS spine soft tissue/joints for the purpose of modulating pain, promoting relaxation,  increasing ROM, reducing/eliminating soft tissue swelling/inflammation/restriction, improving soft tissue extensibility and allowing for proper ROM for normal function with self care, mobility, lifting and ambulation. Individual Aquatic Therapy:  [x] (31380) Provided verbal and tactile cueing for strengthening, flexibility, ROM using the therapeutic properties of water (buoyancy, resistance) for improvements in core control, mobility and ambulation     Aquatic Group:  [] (71633) Provided intermittent verbal and tactile cueing for strengthening, endurance, flexibility and ROM for 2-4 individuals that do not require one-on one patient contact by the therapist       Land Timed Code Treatment Minutes: 54   Land Total Treatment Minutes: 55     [] EVAL (LOW) 26632 (typically 20 minutes face-to-face)  [x] EVAL (MOD) 18374 (typically 30 minutes face-to-face)  [] EVAL (HIGH) 21956 (typically 45 minutes face-to-face)  [] RE-EVAL     [x] CE(77954) x  1  [] Dry needle 1 or 2 Muscles (00037)  [x] NMR (15043) x    1 [] Dry needle 3+ Muscles (58359)  [] Manual (44144) x     [] Ultrasound (14943) x  [x] TA (87857) x   2  [] Mech Traction (51327)  [] ES(attended) (31072)     [] ES (un) (69821):   [] Vasopump (07697) [] Ionto (63534)   [] Aquatic Ind (07675) x 3   [] Aquatic Group (98036) x0  [] Other:    Treatment/Activity Tolerance:  [x] Patient tolerated treatment well [] Patient limited by fatigue  [] Patient limited by pain  [] Patient limited by other medical complications  [] Other:     Prognosis: [] Good [] Fair  [] Poor     ASSESSMENT Pt with history of UE/LE weakness 2/2 cord compression. Will benefit from aquatic therapy   2/10: Pt completed exercises as instructed. Pt notes dec pain after aquatic therapy. Pt would benefit from skilled instruction for UE exer, cervical ROM, balance and posture awareness to dec pain, inc ROM and improve function. 2/15: Pt without c/o throughout treatment. Verbal cues needed to continue to look ahead / neutral neck posture as pt tends to look down into pool during exercises. Pt able to correct with cues.  Pt would benefit from continued skilled instruction for posture and exercises to inc strength and posture awareness. 2/17: Pt tolerated exercises however pt notes LB pain continued. Pt demonstrating improved posture today. Pt will be benefit from continued skilled instruction to progress exercises for inc strength for ADL's and posture. 2/22: Pt tolerates exercises with proper technique would benefit from skilled instruction to inc strength and continue improved posture. 2/24: Pt demonstrates improved posture and exercise technique. Pt would benefit from additional skilled instruction to inc strength and functional ADL's.   3/3: Pt reports pain 0/10 after aquatics today. Re-eval next treatment. 3/8: Pt notes dec pain after aquatic therapy. 3/25 -  Demonstrates improved balance as evidence by increased times for tandem ,SLS    GOALS:  Patient stated goal: TO be able to move better and not have as much pain  []? Progressing: []? Met: []? Not Met: []? Adjusted     Therapist goals for Patient:   Short Term Goals: To be achieved in: 2 weeks  1. Independent in HEP and progression per patient tolerance, in order to prevent re-injury. [x]? Progressing: []? Met: []? Not Met: []? Adjusted  2. Patient will have a decrease in pain to facilitate improvement in movement, function, and ADLs as indicated by Functional Deficits. [x]? Progressing: []? Met: []? Not Met: []? Adjusted     Long Term Goals: To be achieved in: 12 weeks  1. Disability index score of 30% or less for the NDI to assist with reaching prior level of function. []? Progressing: []? Met: []? Not Met: []? Adjusted  2. Patient will demonstrate increased AROM to [facility] of cervical/thoracic spine to allow for proper joint functioning as indicated by patients Functional Deficits. []? Progressing: []? Met: []? Not Met: []? Adjusted  3.  Patient will demonstrate an increase in postural awareness and control and activation of  Deep cervical stabilizers to allow for proper functional mobility as indicated by patients Functional Deficits. []? Progressing: []? Met: []? Not Met: []? Adjusted  4. Patient will return to 70% functional activities without increased symptoms or restriction. []? Progressing: []? Met: []? Not Met: []? Adjusted  5. To be able to walk better with better balance. []? Progressing: []? Met: []? Not Met: []? Adjusted           Patient Requires Follow-up: [x] Yes  [] No    Plan: Progress to land based program  [x] Continue per plan of care [] Alter current plan (see comments)  /[] Plan of care initiated [] Hold pending MD visit [] Discharge    Plan for Next Session:   Increase land PT as tolerated    Electronically signed by:  Perla Isaac, PT  DPT, MS Cook.Muna Morfin: If patient does not return for scheduled/recommended follow up visits, this note will serve as a discharge from care along with the most recent update on progress.

## 2022-03-29 ENCOUNTER — HOSPITAL ENCOUNTER (OUTPATIENT)
Dept: PHYSICAL THERAPY | Age: 42
Setting detail: THERAPIES SERIES
Discharge: HOME | End: 2022-03-29
Payer: OTHER GOVERNMENT

## 2022-03-29 PROCEDURE — 97110 THERAPEUTIC EXERCISES: CPT

## 2022-03-29 PROCEDURE — 97112 NEUROMUSCULAR REEDUCATION: CPT

## 2022-03-29 PROCEDURE — 97530 THERAPEUTIC ACTIVITIES: CPT

## 2022-03-29 NOTE — FLOWSHEET NOTE
[facility]  [address] [facility]  Phone: [phone]   Fax:     [phone]      Physical Therapy Daily/Aquatic Flow Sheet   Date:  3/29/2022    Patient Name:  Catrina Cazares    :  1980  MRN: [id number]    Restrictions/Precautions:   None noted  Pertinent Medical History:Additional Pertinent Hx: HTN, back surgery     Medical/Treatment Diagnosis Information:   · Diagnosis: M54.2 (ICD-10-CM) - Neck pain  · Treatment Diagnosis: Decreased functional mobility 2/2 neck pain. Insurance/Certification information:  PT Insurance Information: Kunal  Physician Information:  Referring Practitioner: Sadaf Nelson of [facility] signed (Y/N): sent    Date of Patient follow up with Physician:      Progress Report: []  Yes  [x]  No     Date Range for reporting period:  Beginning:  3/29/2022  Ending:      Progress report due (10 Rx/or 30 days whichever is less): 3/8     Recertification due (POC duration/ or 90 days whichever is less):      Visit # POC/Insurance Allowable Auth Needed   11 30 []Yes   [x]No     Latex Allergy:  [x]NO      []YES  Preferred Language for Healthcare:   [x]English       []Other:    History of Present Condition   21     POSTERIOR CERVICAL LAMINIOPLASTY C4-5-6-7 ; ANTERIOR CERVICAL DISCECTOMY FUSION C4-5, C5-6 , C6-7 ASPIRATE BONE MARROW RIGHT ILIAC CREST AND C6 CORPECTOMY            MD note 22  The patient is now approximately six to seven months post anterior-posterior fusion. His sequential scans show marked improvement in the cervical stenosis, cord compression and swelling. The swelling in his cord is improving. His subarachnoid space is now circumferentially present.           SUBJECTIVE: Pt reports weakness in his arms at times with NT, also bilateral hands. Notes weakness in both legs R>L  3/8 - Notes that his balance is still an issue, still has some SOB with minimal activity.  Has compression stockings, has helped the swelling. Neck pain is on and off.  5-6/10 . Currently 1-2/10 was a 3-4 before the pool     Relevant Medical History:Additional Pertinent Hx: HTN, back surgery 2014/2015  Functional Disability Index:  NDI   2/8/22 21     NDI 3/8/22  21    [place] Balance       ITEM DESCRIPTION SCORE (0-4)  1. Sitting to standing ________  2. Standing unsupported ________  3. Sitting unsupported ________  4. Standing to sitting ________  5. Transfers ________  6. Standing with eyes closed ________  7. Standing with feet together ________  8. Reaching forward with outstretched arm ________  9. Retrieving object from floor ________  10. Turning to look behind ________  11. Turning 360 degrees ________  12. Placing alternate foot on stool ________  13. Standing with one foot in front ________  14. Standing on one foot ________  Total _______         Pain Scale: 3/10 neck    Easing factors:  Rest.  Provocative factors:  Initial AM/ too much activity     Type: []? Constant       [x]? Intermittent  []? Radiating     []? Localized     []? other:                Numbness/Tingling: through UE's     Occupation/School:  Currently unable to work     Living Status/Prior Level of Function: lives with family. [address] home with railing. Indep with self care. Difficulty putting on pants on right leg, difficulty with sock and shoe on right side as well. Difficulty descending steps. Sleeps in recliner 2/2 neck and back issues.      SUBJECTIVE: Pt reports weakness in his arms at times with NT, also bilateral hands. Notes weakness in both legs R>L                          3/22/22 - States he had significant increased LB / LE pain after last appt ( 1st land therapy)  3/29: Pt reports balance a little better. Got in the pool yesterday and did the aquatic exercises without c/o. OBJECTIVE:   Palpation: no specific TTP noted. Has mild pitting edema moreso on right than left.     Functional Mobility/Transfers:  Mod I - needs arms of chair for leverage    UE -  ROM  WFL  Strength  Grossly 4-/5 throughout     LE ROM -  WFL mario LE's, increased effort to initiate movement RLE  LE strength - grossly 4/5 left,  4-/5 right.     Balance -  Feet together - wfl ,  Modified tandem min sway. Tandem  R/L increased sway with R/L  - unable   3/8 - right in front tandem  30 sec. Left in front tandem 15 sec, inc sway  3/25 narrow ZO  wfl  -  R/L sec tandem 30 sec  SLS right 23 sec        Left  30 sec      Land-based Visits Exercises/Activities:  Therapeutic Ex (08668)  Min: 20   Resistance/Repetitions Notes   Nustep seat 14                           (LE only) 5 min  Level 2    HSS 30 sec x 2    GSS 30 sec x 2         Leg Press               #6 30#  2 x 10 B   Separate into R/L   Knee extension 11# -  2x10   B Do R/L sep   Knee flexion 11# -  2x10   B Do R/L sep               Therapeutic Activity (69932) Min: 30          BOSU add    Rocker board   F/b, s/s 1 min each    Side stepping 4 lengths    Step tap 10         gumdrops Balance 30 sec x 2 HHA prn for balance   Tandem gait 4 lengths         Tandem stance R/L 30 sec x 2    Tandem on 6\" step 30 sec x 2       Step ups 8\"     2 x 10 R/L opposite hand hold    Fitness center Review of seated elliptical, bosu and pool Pt to being aquatics 2x/week   One leg stand30 sec R/L HHA prn for balance   Walking on mat 2 mats on floor Walking gt, marching gt without LOBNMR re-education (33373) Min:  5          Quadruped  opp  Extremity lift X 5 each,  3 second hold         1/2 kneeling 2 min each 3/29:Orange ball UE's: [place] CW/CCW, Chest press x 10 each, No LOB noted        Bridge with hip add ball Arms crossed over chest  X 10 Stabilized with UE   Bridge with LE's on purple ball Arms resting on mat x 10        Other Therapeutic Activities:  Pt was educated on PT POC, Diagnosis, Prognosis, pathomechanics as well as frequency and duration of scheduling future physical therapy appointments.  Time was also taken on this day to answer all patient questions and participation in PT. Reviewed appointment policy in detail with patient and patient verbalized understanding. Home Exercise Program:  Patient was instructed in the following for HEP:     . Patient verbalized/demonstrated understanding and was issued written handout. Charges: Therapeutic Exercise and NMR EXR  [] (60665) Provided verbal/tactile cueing for activities related to strengthening, flexibility, endurance, ROM for improvements in LE, proximal hip, and core control with self care, mobility, lifting, ambulation.  [] (03964) Provided verbal/tactile cueing for activities related to improving balance, coordination, kinesthetic sense, posture, motor skill, proprioception  to assist with LE, proximal hip, and core control in self care, mobility, lifting, ambulation and eccentric single leg control.      NMR and Therapeutic Activities:    [] (38553 or 86141) Provided verbal/tactile cueing for activities related to improving balance, coordination, kinesthetic sense, posture, motor skill, proprioception and motor activation to allow for proper function of core, proximal hip and LE with self care and ADLs and functional mobility.   [] (30968) Gait Re-education- Provided training and instruction to the patient for proper LE, core and proximal hip recruitment and positioning and eccentric body weight control with ambulation re-education including up and down stairs     Home Exercise Program:    [x] (58080) Reviewed/Progressed HEP activities related to strengthening, flexibility, endurance, ROM of core, proximal hip and LE for functional self-care, mobility, lifting and ambulation/stair navigation   [] (97467)Reviewed/Progressed HEP activities related to improving balance, coordination, kinesthetic sense, posture, motor skill, proprioception of core, proximal hip and LE for self care, mobility, lifting, and ambulation/stair navigation      Manual Treatments:  PROM / STM / Oscillations-Mobs: G-I, II, III, IV (PA's, Inf., Post.)  [] (09977) Provided manual therapy to mobilize LE, proximal hip and/or LS spine soft tissue/joints for the purpose of modulating pain, promoting relaxation,  increasing ROM, reducing/eliminating soft tissue swelling/inflammation/restriction, improving soft tissue extensibility and allowing for proper ROM for normal function with self care, mobility, lifting and ambulation. Individual Aquatic Therapy:  [] (40218) Provided verbal and tactile cueing for strengthening, flexibility, ROM using the therapeutic properties of water (buoyancy, resistance) for improvements in core control, mobility and ambulation     Aquatic Group:  [] (40692) Provided intermittent verbal and tactile cueing for strengthening, endurance, flexibility and ROM for 2-4 individuals that do not require one-on one patient contact by the therapist       Land Timed Code Treatment Minutes: 54   Land Total Treatment Minutes: 55     [] EVAL (LOW) 50407 (typically 20 minutes face-to-face)  [x] EVAL (MOD) 12591 (typically 30 minutes face-to-face)  [] EVAL (HIGH) 47304 (typically 45 minutes face-to-face)  [] RE-EVAL     [x] DT(41848) x  1  [] Dry needle 1 or 2 Muscles (04275)  [x] NMR (28615) x    1 [] Dry needle 3+ Muscles (93585)  [] Manual (23238) x     [] Ultrasound (46082) x  [x] TA (86513) x   2  [] Mech Traction (94610)  [] ES(attended) (46291)     [] ES (un) (75855):   [] Vasopump (33387) [] Ionto (52820)   [] Aquatic Ind (71721) x    [] Aquatic Group (69576) x0  [] Other:    Treatment/Activity Tolerance:  [x] Patient tolerated treatment well [] Patient limited by fatigue  [] Patient limited by pain  [] Patient limited by other medical complications  [] Other:     Prognosis: [] Good [] Fair  [] Poor     ASSESSMENT Pt with history of UE/LE weakness 2/2 cord compression. Will benefit from aquatic therapy   2/10: Pt completed exercises as instructed. Pt notes dec pain after aquatic therapy.  Pt would benefit from skilled instruction for UE exer, cervical ROM, balance and posture awareness to dec pain, inc ROM and improve function. 2/15: Pt without c/o throughout treatment. Verbal cues needed to continue to look ahead / neutral neck posture as pt tends to look down into pool during exercises. Pt able to correct with cues. Pt would benefit from continued skilled instruction for posture and exercises to inc strength and posture awareness. 2/17: Pt tolerated exercises however pt notes LB pain continued. Pt demonstrating improved posture today. Pt will be benefit from continued skilled instruction to progress exercises for inc strength for ADL's and posture. 2/22: Pt tolerates exercises with proper technique would benefit from skilled instruction to inc strength and continue improved posture. 2/24: Pt demonstrates improved posture and exercise technique. Pt would benefit from additional skilled instruction to inc strength and functional ADL's.   3/3: Pt reports pain 0/10 after aquatics today. Re-eval next treatment. 3/8: Pt notes dec pain after aquatic therapy. 3/25 -  Demonstrates improved balance as evidence by increased times for tandem ,SLS  3/29: Pt demonstrates continued improve balance as seen with pt amb on mat and marching on mat, rocker board without HHA, and heel-toe gait without LOB. Pt would benefit from continued advanced balance activities and strengthening for more challenging ADL's. GOALS:  Patient stated goal: TO be able to move better and not have as much pain  []? Progressing: []? Met: []? Not Met: []? Adjusted     Therapist goals for Patient:   Short Term Goals: To be achieved in: 2 weeks  1. Independent in HEP and progression per patient tolerance, in order to prevent re-injury. [x]? Progressing: []? Met: []? Not Met: []? Adjusted  2. Patient will have a decrease in pain to facilitate improvement in movement, function, and ADLs as indicated by Functional Deficits. [x]?  Progressing: []? Met: []? Not Met: []? Adjusted     Long Term Goals: To be achieved in: 12 weeks  1. Disability index score of 30% or less for the NDI to assist with reaching prior level of function. []? Progressing: []? Met: []? Not Met: []? Adjusted  2. Patient will demonstrate increased AROM to [facility] of cervical/thoracic spine to allow for proper joint functioning as indicated by patients Functional Deficits. []? Progressing: []? Met: []? Not Met: []? Adjusted  3. Patient will demonstrate an increase in postural awareness and control and activation of  Deep cervical stabilizers to allow for proper functional mobility as indicated by patients Functional Deficits. []? Progressing: []? Met: []? Not Met: []? Adjusted  4. Patient will return to 70% functional activities without increased symptoms or restriction. []? Progressing: []? Met: []? Not Met: []? Adjusted  5. To be able to walk better with better balance. []? Progressing: []? Met: []? Not Met: []? Adjusted           Patient Requires Follow-up: [x] Yes  [] No    Plan: Progress to land based program  [x] Continue per plan of care [] Alter current plan (see comments)  /[] Plan of care initiated [] Hold pending MD visit [] Discharge    Plan for Next Session:   Increase land PT as tolerated    Electronically signed by:  Ruth Sanford, PTA [id number]    Geralyn Opitz: If patient does not return for scheduled/recommended follow up visits, this note will serve as a discharge from care along with the most recent update on progress.

## 2022-04-01 ENCOUNTER — HOSPITAL ENCOUNTER (OUTPATIENT)
Dept: PHYSICAL THERAPY | Age: 42
Setting detail: THERAPIES SERIES
Discharge: HOME | End: 2022-04-01
Payer: OTHER GOVERNMENT

## 2022-04-01 PROCEDURE — 97530 THERAPEUTIC ACTIVITIES: CPT

## 2022-04-01 PROCEDURE — 97112 NEUROMUSCULAR REEDUCATION: CPT

## 2022-04-01 NOTE — FLOWSHEET NOTE
[address] [facility],[address], [facility]  [address] [place], [facility]  Phone: [phone]   Fax:     [phone]      Physical Therapy Daily/Aquatic Flow Sheet   Date:  2022    Patient Name:  Tonia Rodriguez    :  1980  MRN: [id number]    Restrictions/Precautions:   None noted  Pertinent Medical History:Additional Pertinent Hx: HTN, back surgery     Medical/Treatment Diagnosis Information:   · Diagnosis: M54.2 (ICD-10-CM) - Neck pain  · Treatment Diagnosis: Decreased functional mobility 2/2 neck pain. Insurance/Certification information:  PT Insurance Information: Kunal  Physician Information:  Referring Practitioner: Aspen Blackman of care signed (Y/N): sent    Date of Patient follow up with Physician:      Progress Report: []  Yes  [x]  No     Date Range for reporting period:  Beginnin2022  Ending:      Progress report due (10 Rx/or 30 days whichever is less): 3/8     Recertification due (POC duration/ or 90 days whichever is less):      Visit # POC/Insurance Allowable Auth Needed   12 30 []Yes   [x]No     Latex Allergy:  [x]NO      []YES  Preferred Language for Healthcare:   [x]English       []Other:    History of Present Condition   21     POSTERIOR CERVICAL LAMINIOPLASTY C4-5-6-7 ; ANTERIOR CERVICAL DISCECTOMY FUSION C4-5, C5-6 , C6-7 ASPIRATE BONE MARROW RIGHT ILIAC CREST AND C6 CORPECTOMY            MD note 22  The patient is now approximately six to seven months post anterior-posterior fusion. His sequential scans show marked improvement in the cervical stenosis, cord compression and swelling. The swelling in his cord is improving. His subarachnoid space is now circumferentially present.           SUBJECTIVE: Pt reports weakness in his arms at times with NT, also bilateral hands. Notes weakness in both legs R>L  3/8 - Notes that his balance is still an issue, still has some SOB with minimal activity.  Has compression stockings, has helped the swelling. Neck pain is on and off.  5-6/10 . Currently 1-2/10 was a 3-4 before the pool     Relevant Medical History:Additional Pertinent Hx: HTN, back surgery 2014/2015  Functional Disability Index:  NDI   2/8/22 21     NDI 3/8/22  21    Richards Balance   4/1/22     ITEM DESCRIPTION SCORE (0-4)  1. Sitting to standing ____4____  2. Standing unsupported _____4___  3. Sitting unsupported _______4_  4. Standing to sitting ______4__  5. Transfers _4_______  6. Standing with eyes closed ____4____  7. Standing with feet together __4______  8. Reaching forward with outstretched arm __4______  9. Retrieving object from floor ____4____  10. Turning to look behind _4_______  11. Turning 360 degrees ___4_____  12. Placing alternate foot on stool ___4_____wobbly  13. Standing with one foot in front ___4_____  14. Standing on one foot ____2____  Total _54______         Pain Scale: 3/10 neck     SUBJECTIVE: Pt reports weakness in his arms at times with NT, also bilateral hands. Notes weakness in both legs R>L                          3/22/22 - States he had significant increased LB / LE pain after last appt ( 1st land therapy)  3/29: Pt reports balance a little better. Got in the pool yesterday and did the aquatic exercises without c/o. OBJECTIVE:   Palpation: no specific TTP noted. Has mild pitting edema moreso on right than left.     Functional Mobility/Transfers: indep without UE    UE -  ROM  WFL  Strength  Grossly 4-/5 throughout     LE ROM -  WFL mario LE's, increased effort to initiate movement RLE  LE strength - grossly 4/5 left,  4-/5 right.     Balance -  Feet together - wfl ,  Modified tandem min sway. Tandem  R/L increased sway with R/L  - unable   3/8 - right in front tandem  30 sec.   Left in front tandem 15 sec, inc sway  3/25 narrow OZ  wfl  -  R/L sec tandem 30 sec  SLS right 23 sec        Left  30 sec      Land-based Visits Exercises/Activities:  Therapeutic Ex (53967)  Min: 10 Resistance/Repetitions Notes   Nustep seat 14                           (LE only) 5 min  Level 2    HSS 30 sec x 2    GSS 30 sec x 2                     Therapeutic Activity (74945) Min: 30          BOSU upside down 1 min    Rocker board   F/b, s/s 1 min each         Step tap 10 onto 6\" step SLOWLY    Tandem gait 4 lengths         Tandem stance R/L 30 sec x 2 with rotation with ball in hands    Tandem on 4\" step  Tandem on gumdrops 30 sec x 2   30 sec x 2 R/L      Step ups 8\"     2 x 10 R/L opposite hand hold    One leg stand30 sec R/L HHA prn for balance   Walking on mat 2 mats on floor Walking gt, marching gt without LOBNMR re-education (85397) Min:  15          Quadruped  opp  Extremity lift X 10 each   5 sec hold          1/2 kneeling  On Left    1/2 kneeling On Right    Mini squat start in stance and go to 1/2 kneeling Ball rotation in hands R/L  [place] toss to wall   Same    ADD steady    GUARD Unsteady            Bridge with LE's on purple ball Arms crossed over chest  X 10             Other Therapeutic Activities:  Pt was educated on PT POC, Diagnosis, Prognosis, pathomechanics as well as frequency and duration of scheduling future physical therapy appointments. Time was also taken on this day to answer all patient questions and participation in PT. Reviewed appointment policy in detail with patient and patient verbalized understanding. Home Exercise Program:  Patient was instructed in the following for HEP:     . Patient verbalized/demonstrated understanding and was issued written handout. Charges: Therapeutic Exercise and NMR EXR  [x] (04150) Provided verbal/tactile cueing for activities related to strengthening, flexibility, endurance, ROM for improvements in LE, proximal hip, and core control with self care, mobility, lifting, ambulation.   [x] (59272) Provided verbal/tactile cueing for activities related to improving balance, coordination, kinesthetic sense, posture, motor skill, proprioception  to assist with LE, proximal hip, and core control in self care, mobility, lifting, ambulation and eccentric single leg control. NMR and Therapeutic Activities:    [x] (93113 or 95974) Provided verbal/tactile cueing for activities related to improving balance, coordination, kinesthetic sense, posture, motor skill, proprioception and motor activation to allow for proper function of core, proximal hip and LE with self care and ADLs and functional mobility.    [x] (44016) Gait Re-education- Provided training and instruction to the patient for proper LE, core and proximal hip recruitment and positioning and eccentric body weight control with ambulation re-education including up and down stairs     Home Exercise Program:    [x] (71775) Reviewed/Progressed HEP activities related to strengthening, flexibility, endurance, ROM of core, proximal hip and LE for functional self-care, mobility, lifting and ambulation/stair navigation   [] (64939)Reviewed/Progressed HEP activities related to improving balance, coordination, kinesthetic sense, posture, motor skill, proprioception of core, proximal hip and LE for self care, mobility, lifting, and ambulation/stair navigation        Land Timed Code Treatment Minutes: 45   Land Total Treatment Minutes: 45     [] EVAL (LOW) 54053 (typically 20 minutes face-to-face)  [] EVAL (MOD) 15265 (typically 30 minutes face-to-face)  [] EVAL (HIGH) 09508 (typically 45 minutes face-to-face)  [] RE-EVAL     [] QX(95570) x    [] Dry needle 1 or 2 Muscles (24799)  [x] NMR (53129) x   1                   Dry needle 3+ Muscles (70862)  [] Manual (32575) x     [] Ultrasound (65824) x  [x] TA (80482) x   2  [] Mech Traction (45254)  [] ES(attended) (19738)     [] ES (un) (29546):   [] Vasopump (14641) [] Ionto (50424)   [] Aquatic Ind (04272) x    [] Aquatic Group (73790) x0  [] Other:    Treatment/Activity Tolerance:  [x] Patient tolerated treatment well [] Patient limited by fatigue  [] Patient limited by pain  [] Patient limited by other medical complications  [] Other:     Prognosis: [] Good [] Fair  [] Poor     ASSESSMENT Pt with history of UE/LE weakness 2/2 cord compression. Will benefit from aquatic therapy   2/10: Pt completed exercises as instructed. Pt notes dec pain after aquatic therapy. Pt would benefit from skilled instruction for UE exer, cervical ROM, balance and posture awareness to dec pain, inc ROM and improve function. 2/15: Pt without c/o throughout treatment. Verbal cues needed to continue to look ahead / neutral neck posture as pt tends to look down into pool during exercises. Pt able to correct with cues. Pt would benefit from continued skilled instruction for posture and exercises to inc strength and posture awareness. 2/17: Pt tolerated exercises however pt notes LB pain continued. Pt demonstrating improved posture today. Pt will be benefit from continued skilled instruction to progress exercises for inc strength for ADL's and posture. 2/22: Pt tolerates exercises with proper technique would benefit from skilled instruction to inc strength and continue improved posture. 2/24: Pt demonstrates improved posture and exercise technique. Pt would benefit from additional skilled instruction to inc strength and functional ADL's.   3/3: Pt reports pain 0/10 after aquatics today. Re-eval next treatment. 3/8: Pt notes dec pain after aquatic therapy. 3/25 -  Demonstrates improved balance as evidence by increased times for tandem ,SLS  3/29: Pt demonstrates continued improve balance as seen with pt amb on mat and marching on mat, rocker board without HHA, and heel-toe gait without LOB. Pt would benefit from continued advanced balance activities and strengthening for more challenging ADL's. GOALS:  Patient stated goal: TO be able to move better and not have as much pain  [x]? Progressing: []? Met: []? Not Met: []? Adjusted     Therapist goals for Patient:   Short Term Goals:  To be achieved in: 2 weeks  1. Independent in HEP and progression per patient tolerance, in order to prevent re-injury. [x]? Progressing: []? Met: []? Not Met: []? Adjusted  2. Patient will have a decrease in pain to facilitate improvement in movement, function, and ADLs as indicated by Functional Deficits. [x]? Progressing: []? Met: []? Not Met: []? Adjusted     Long Term Goals: To be achieved in: 12 weeks  1. Disability index score of 30% or less for the NDI to assist with reaching prior level of function. [x]? Progressing: []? Met: []? Not Met: []? Adjusted  2. Patient will demonstrate increased AROM to [facility] of cervical/thoracic spine to allow for proper joint functioning as indicated by patients Functional Deficits. [x]? Progressing: []? Met: []? Not Met: []? Adjusted  3. Patient will demonstrate an increase in postural awareness and control and activation of  Deep cervical stabilizers to allow for proper functional mobility as indicated by patients Functional Deficits. [x]? Progressing: []? Met: []? Not Met: []? Adjusted  4. Patient will return to 70% functional activities without increased symptoms or restriction. [x]? Progressing: []? Met: []? Not Met: []? Adjusted  5. To be able to walk better with better balance. [x]? Progressing: []? Met: []? Not Met: []? Adjusted           Patient Requires Follow-up: [x] Yes  [] No    Plan: Progress to land based program  [x] Continue per plan of care [] Alter current plan (see comments)  /[] Plan of care initiated [] Hold pending MD visit [] Discharge    Plan for Next Session:  Continue to push high level balance activities    Electronically signed by:  Stefani Richardson PT [place].Christopher Boateng: If patient does not return for scheduled/recommended follow up visits, this note will serve as a discharge from care along with the most recent update on progress.

## 2022-04-05 ENCOUNTER — HOSPITAL ENCOUNTER (OUTPATIENT)
Dept: PHYSICAL THERAPY | Age: 42
Setting detail: THERAPIES SERIES
Discharge: HOME | End: 2022-04-05
Payer: OTHER GOVERNMENT

## 2022-04-05 PROCEDURE — 97530 THERAPEUTIC ACTIVITIES: CPT

## 2022-04-05 PROCEDURE — 97112 NEUROMUSCULAR REEDUCATION: CPT

## 2022-04-05 PROCEDURE — 97110 THERAPEUTIC EXERCISES: CPT

## 2022-04-05 NOTE — FLOWSHEET NOTE
[address] [address], [facility]  [address] [facility]  Phone: [phone]   Fax:     [phone]      Physical Therapy Daily/Aquatic Flow Sheet   Date:  2022    Patient Name:  Joslyn Soto    :  1980  MRN: [id number]    Restrictions/Precautions:   None noted  Pertinent Medical History:Additional Pertinent Hx: HTN, back surgery     Medical/Treatment Diagnosis Information:   · Diagnosis: M54.2 (ICD-10-CM) - Neck pain  · Treatment Diagnosis: Decreased functional mobility 2/2 neck pain. Insurance/Certification information:  PT Insurance Information: Kunal  Physician Information:  Referring Practitioner: Arin Seymour of care signed (Y/N): sent    Date of Patient follow up with Physician:      Progress Report: []  Yes  [x]  No     Date Range for reporting period:  Beginnin2022  Ending:      Progress report due (10 Rx/or 30 days whichever is less): 3/8     Recertification due (POC duration/ or 90 days whichever is less):      Visit # POC/Insurance Allowable Auth Needed   13 30 []Yes   [x]No     Latex Allergy:  [x]NO      []YES  Preferred Language for Healthcare:   [x]English       []Other:    History of Present Condition   21     POSTERIOR CERVICAL LAMINIOPLASTY C4-5-6-7 ; ANTERIOR CERVICAL DISCECTOMY FUSION C4-5, C5-6 , C6-7 ASPIRATE BONE MARROW RIGHT ILIAC CREST AND C6 CORPECTOMY            MD note 22  The patient is now approximately six to seven months post anterior-posterior fusion. His sequential scans show marked improvement in the cervical stenosis, cord compression and swelling. The swelling in his cord is improving. His subarachnoid space is now circumferentially present.           SUBJECTIVE: Pt reports weakness in his arms at times with NT, also bilateral hands. Notes weakness in both legs R>L  3/8 - Notes that his balance is still an issue, still has some SOB with minimal activity.  Has compression stockings, has helped the swelling. Neck pain is on and off.  5-6/10 . Currently 1-2/10 was a 3-4 before the pool     Relevant Medical History:Additional Pertinent Hx: HTN, back surgery 2014/2015  Functional Disability Index:  NDI   2/8/22 21     NDI 3/8/22  21    [place] Balance   4/1/22     ITEM DESCRIPTION SCORE (0-4)  1. Sitting to standing ____4____  2. Standing unsupported _____4___  3. Sitting unsupported _______4_  4. Standing to sitting ______4__  5. Transfers _4_______  6. Standing with eyes closed ____4____  7. Standing with feet together __4______  8. Reaching forward with outstretched arm __4______  9. Retrieving object from floor ____4____  10. Turning to look behind _4_______  11. Turning 360 degrees ___4_____  12. Placing alternate foot on stool ___4_____wobbly  13. Standing with one foot in front ___4_____  14. Standing on one foot ____2____  Total _54______         Pain Scale: 3/10 neck     SUBJECTIVE: Pt reports weakness in his arms at times with NT, also bilateral hands. Notes weakness in both legs R>L                          3/22/22 - States he had significant increased LB / LE pain after last appt ( 1st land therapy)  3/29: Pt reports balance a little better. Got in the pool yesterday and did the aquatic exercises without c/o. OBJECTIVE:   Palpation: no specific TTP noted. Has mild pitting edema moreso on right than left.     Functional Mobility/Transfers: indep without UE    UE -  ROM  WFL  Strength  Grossly 4-/5 throughout     LE ROM -  WFL mario LE's, increased effort to initiate movement RLE  LE strength - grossly 4/5 left,  4-/5 right.     Balance -  Feet together - wfl ,  Modified tandem min sway. Tandem  R/L increased sway with R/L  - unable   3/8 - right in front tandem  30 sec.   Left in front tandem 15 sec, inc sway  3/25 narrow ZO  wfl  -  R/L sec tandem 30 sec  SLS right 23 sec        Left  30 sec      Land-based Visits Exercises/Activities:  Therapeutic Ex (69348)  Min: 10 Resistance/Repetitions Notes   Nustep seat 14                           (LE only) 5 min  Level 2    HSS 30 sec x 2    GSS 30 sec x 2                     Therapeutic Activity (38477) Min: 30          BOSU upside down 1 min    Rocker board   F/b, s/s 1 min each         Step tap 10 onto 6\" step SLOWLY    Tandem gait 4 lengths         Tandem stance R/L 30 sec x 2 with rotation with ball in hands    Tandem on 4\" step  Tandem on gumdrops 30 sec x 2   30 sec x 2 R/L      Step ups 8\"     2 x 10 R/L opposite hand hold    One leg stand30 sec R/L HHA prn for balance   Walking on mat 2 mats on floor Walking gt, marching gt without LOBabd reclines X 10    Supiine 90/90 X 10  10 sec hold    Pilates leg lowering With bands  ADD NMR re-education (72369) Min:  15          Quadruped  opp  Extremity lift X 10 each   5 sec hold              1/2 kneeling On Right    Mini squat start in stance and go to 1/2 kneeling Ball rotation in hands R/L  [place] toss to wall   Same    ADD     GUARD Unsteady            Bridge with LE's on purple ball Arms crossed over chest  X 10             Other Therapeutic Activities:  Pt was educated on PT POC, Diagnosis, Prognosis, pathomechanics as well as frequency and duration of scheduling future physical therapy appointments. Time was also taken on this day to answer all patient questions and participation in PT. Reviewed appointment policy in detail with patient and patient verbalized understanding. Home Exercise Program:  Patient was instructed in the following for HEP:     . Patient verbalized/demonstrated understanding and was issued written handout. Charges: Therapeutic Exercise and NMR EXR  [x] (13627) Provided verbal/tactile cueing for activities related to strengthening, flexibility, endurance, ROM for improvements in LE, proximal hip, and core control with self care, mobility, lifting, ambulation.   [x] (10183) Provided verbal/tactile cueing for activities related to improving balance, coordination, kinesthetic sense, posture, motor skill, proprioception  to assist with LE, proximal hip, and core control in self care, mobility, lifting, ambulation and eccentric single leg control. NMR and Therapeutic Activities:    [x] (86150 or 64415) Provided verbal/tactile cueing for activities related to improving balance, coordination, kinesthetic sense, posture, motor skill, proprioception and motor activation to allow for proper function of core, proximal hip and LE with self care and ADLs and functional mobility.    [x] (11507) Gait Re-education- Provided training and instruction to the patient for proper LE, core and proximal hip recruitment and positioning and eccentric body weight control with ambulation re-education including up and down stairs     Home Exercise Program:    [x] (79662) Reviewed/Progressed HEP activities related to strengthening, flexibility, endurance, ROM of core, proximal hip and LE for functional self-care, mobility, lifting and ambulation/stair navigation   [] (11304)Reviewed/Progressed HEP activities related to improving balance, coordination, kinesthetic sense, posture, motor skill, proprioception of core, proximal hip and LE for self care, mobility, lifting, and ambulation/stair navigation        Land Timed Code Treatment Minutes: 45   Land Total Treatment Minutes: 45     [] EVAL (LOW) 53250 (typically 20 minutes face-to-face)  [] EVAL (MOD) 17955 (typically 30 minutes face-to-face)  [] EVAL (HIGH) 56827 (typically 45 minutes face-to-face)  [] RE-EVAL     [x] OA(39392) x  1  [] Dry needle 1 or 2 Muscles (93078)  [x] NMR (32466) x   1                   Dry needle 3+ Muscles (58433)  [] Manual (81139) x     [] Ultrasound (15454) x  [x] TA (75828) x   1  [] Mech Traction (59733)  [] ES(attended) (10101)     [] ES (un) (50475):   [] Vasopump (94128) [] Ionto (03857)   [] Aquatic Ind (12189) x    [] Aquatic Group (26541) x0  [] Other:    Treatment/Activity Tolerance:  [x] Patient tolerated treatment well [] Patient limited by fatigue  [] Patient limited by pain  [] Patient limited by other medical complications  [] Other:     Prognosis: [] Good [] Fair  [] Poor     ASSESSMENT Pt with history of UE/LE weakness 2/2 cord compression. Will benefit from aquatic therapy   2/10: Pt completed exercises as instructed. Pt notes dec pain after aquatic therapy. Pt would benefit from skilled instruction for UE exer, cervical ROM, balance and posture awareness to dec pain, inc ROM and improve function. 2/15: Pt without c/o throughout treatment. Verbal cues needed to continue to look ahead / neutral neck posture as pt tends to look down into pool during exercises. Pt able to correct with cues. Pt would benefit from continued skilled instruction for posture and exercises to inc strength and posture awareness. 2/17: Pt tolerated exercises however pt notes LB pain continued. Pt demonstrating improved posture today. Pt will be benefit from continued skilled instruction to progress exercises for inc strength for ADL's and posture. 2/22: Pt tolerates exercises with proper technique would benefit from skilled instruction to inc strength and continue improved posture. 2/24: Pt demonstrates improved posture and exercise technique. Pt would benefit from additional skilled instruction to inc strength and functional ADL's.   3/3: Pt reports pain 0/10 after aquatics today. Re-eval next treatment. 3/8: Pt notes dec pain after aquatic therapy. 3/25 -  Demonstrates improved balance as evidence by increased times for tandem ,SLS  3/29: Pt demonstrates continued improve balance as seen with pt amb on mat and marching on mat, rocker board without HHA, and heel-toe gait without LOB. Pt would benefit from continued advanced balance activities and strengthening for more challenging ADL's. GOALS:  Patient stated goal: TO be able to move better and not have as much pain  [x]? Progressing: []? Met: []?  Not Met: []? Adjusted     Therapist goals for Patient:   Short Term Goals: To be achieved in: 2 weeks  1. Independent in HEP and progression per patient tolerance, in order to prevent re-injury. [x]? Progressing: []? Met: []? Not Met: []? Adjusted  2. Patient will have a decrease in pain to facilitate improvement in movement, function, and ADLs as indicated by Functional Deficits. [x]? Progressing: []? Met: []? Not Met: []? Adjusted     Long Term Goals: To be achieved in: 12 weeks  1. Disability index score of 30% or less for the NDI to assist with reaching prior level of function. [x]? Progressing: []? Met: []? Not Met: []? Adjusted  2. Patient will demonstrate increased AROM to [facility] of cervical/thoracic spine to allow for proper joint functioning as indicated by patients Functional Deficits. [x]? Progressing: []? Met: []? Not Met: []? Adjusted  3. Patient will demonstrate an increase in postural awareness and control and activation of  Deep cervical stabilizers to allow for proper functional mobility as indicated by patients Functional Deficits. [x]? Progressing: []? Met: []? Not Met: []? Adjusted  4. Patient will return to 70% functional activities without increased symptoms or restriction. [x]? Progressing: []? Met: []? Not Met: []? Adjusted  5. To be able to walk better with better balance. [x]? Progressing: []? Met: []? Not Met: []? Adjusted           Patient Requires Follow-up: [x] Yes  [] No    Plan: Progress to land based program  [x] Continue per plan of care [] Alter current plan (see comments)  /[] Plan of care initiated [] Hold pending MD visit [] Discharge    Plan for Next Session:  Continue to push high level balance activities    Electronically signed by:  MONIKA Landa.Alberto Patel Dopp: If patient does not return for scheduled/recommended follow up visits, this note will serve as a discharge from care along with the most recent update on progress.

## 2022-04-08 ENCOUNTER — HOSPITAL ENCOUNTER (OUTPATIENT)
Dept: PHYSICAL THERAPY | Age: 42
Setting detail: THERAPIES SERIES
Discharge: HOME | End: 2022-04-08
Payer: OTHER GOVERNMENT

## 2022-04-08 NOTE — FLOWSHEET NOTE
[facility], [facility]  [address] [place], [facility]  Phone: [phone]   Fax:     [phone]    Physical Therapy  Cancellation  Patient Name:  Leonel Chappell  :  1980   Date:  2022  Cancelled visits to date: 2  No-shows to date: 0    Patient status for today's appointment patient:  [x]  Cancelled  []  Rescheduled appointment  []  No-show     Reason given by patient:  []  Patient ill  []  Conflicting appointment  []  No transportation    []  Conflict with work  []  No reason given  [x]  Other:     Comments:  Increased back pain - pt came to dept - not sure if he slept wrong, or overdid in the gym,  But feels he cannot participate with PT today.       Phone call information:   []  Phone call made today to patient at _ time at number provided:      []  Patient answered, conversation as follows:    []  Patient did not answer, message left as follows:  []  Phone call not made today    Electronically signed by:  Stefani Richardson, [address] [facility] [address]

## 2022-04-12 ENCOUNTER — HOSPITAL ENCOUNTER (OUTPATIENT)
Dept: PHYSICAL THERAPY | Age: 42
Setting detail: THERAPIES SERIES
Discharge: HOME | End: 2022-04-12
Payer: OTHER GOVERNMENT

## 2022-04-12 NOTE — FLOWSHEET NOTE
[facility], [facility]  [address] [place], [facility] [place]  Phone: [phone]   Fax:     [phone]    Physical Therapy  Cancellation  Patient Name:  Ebony Watson  :  1980   Date:  2022  Cancelled visits to date: 2  No-shows to date: 0    Patient status for today's appointment patient:  [x]  Cancelled  []  Rescheduled appointment  []  No-show     Reason given by patient:  []  Patient ill  []  Conflicting appointment  []  No transportation    []  Conflict with work  []  No reason given  []  Other:     Comments:   Phone call information:   []  Phone call made today to patient at _ time at number provided:      []  Patient answered, conversation as follows:    []  Patient did not answer, message left as follows:  []  Phone call not made today    Electronically signed by:  Bull Hartley, [address] [facility] [address]

## 2022-04-15 ENCOUNTER — APPOINTMENT (OUTPATIENT)
Dept: PHYSICAL THERAPY | Age: 42
End: 2022-04-15
Payer: OTHER GOVERNMENT

## 2022-04-19 ENCOUNTER — HOSPITAL ENCOUNTER (OUTPATIENT)
Dept: PHYSICAL THERAPY | Age: 42
Setting detail: THERAPIES SERIES
Discharge: HOME | End: 2022-04-19
Payer: OTHER GOVERNMENT

## 2022-04-19 NOTE — FLOWSHEET NOTE
[facility], [facility]  [address] [place], [facility]  Phone: [phone]   Fax:     [phone]    Physical Therapy  Cancellation had to  son from school    Patient Name:  Lina Sweet  :  1980   Date:  2022  Cancelled visits to date: 2  No-shows to date: 0    Patient status for today's appointment patient:  [x]  Cancelled  []  Rescheduled appointment  []  No-show     Reason given by patient:  []  Patient ill  []  Conflicting appointment  []  No transportation    []  Conflict with work  []  No reason given  []  Other:     Comments:   Phone call information:   []  Phone call made today to patient at _ time at number provided:      []  Patient answered, conversation as follows:    []  Patient did not answer, message left as follows:  []  Phone call not made today    Electronically signed by:  Lupe Alfaro, [address] [address]

## 2022-04-22 ENCOUNTER — HOSPITAL ENCOUNTER (OUTPATIENT)
Dept: PHYSICAL THERAPY | Age: 42
Setting detail: THERAPIES SERIES
Discharge: HOME | End: 2022-04-22
Payer: OTHER GOVERNMENT

## 2022-04-22 PROCEDURE — 97110 THERAPEUTIC EXERCISES: CPT

## 2022-04-22 PROCEDURE — 97530 THERAPEUTIC ACTIVITIES: CPT

## 2022-04-22 NOTE — FLOWSHEET NOTE
[address], [place]  [address] [facility]  Phone: [phone]   Fax:     [phone]      Physical Therapy Daily/Aquatic Flow Sheet   Date:  2022    Patient Name:  Dread Garcia    :  1980  MRN: [id number]    Restrictions/Precautions:   None noted  Pertinent Medical History:Additional Pertinent Hx: HTN, back surgery     Medical/Treatment Diagnosis Information:   · Diagnosis: M54.2 (ICD-10-CM) - Neck pain  · Treatment Diagnosis: Decreased functional mobility 2/2 neck pain. Insurance/Certification information:  PT Insurance Information: Kunal  Physician Information:  Referring Practitioner: Cliff Floress of care signed (Y/N): sent    Date of Patient follow up with Physician:      Progress Report: []  Yes  [x]  No     Date Range for reporting period:  Beginnin2022  Ending:      Progress report due (10 Rx/or 30 days whichever is less): 3/8     Recertification due (POC duration/ or 90 days whichever is less):      Visit # POC/Insurance Allowable Auth Needed   14 30 []Yes   [x]No     Latex Allergy:  [x]NO      []YES  Preferred Language for Healthcare:   [x]English       []Other:    History of Present Condition   21     POSTERIOR CERVICAL LAMINIOPLASTY C4-5-6-7 ; ANTERIOR CERVICAL DISCECTOMY FUSION C4-5, C5-6 , C6-7 ASPIRATE BONE MARROW RIGHT ILIAC CREST AND C6 CORPECTOMY            MD note 22  The patient is now approximately six to seven months post anterior-posterior fusion. His sequential scans show marked improvement in the cervical stenosis, cord compression and swelling. The swelling in his cord is improving. His subarachnoid space is now circumferentially present.           SUBJECTIVE: Pt reports weakness in his arms at times with NT, also bilateral hands. Notes weakness in both legs R>L  3/8 - Notes that his balance is still an issue, still has some SOB with minimal activity.  Has compression stockings, has helped the swelling. Neck pain is on and off.  5-6/10 . Currently 1-2/10 was a 3-4 before the pool   4/22 - jpain from neck down to fingertips both sides,  Hands are numb all day long and tingling. Relevant Medical History:Additional Pertinent Hx: HTN, back surgery 2014/2015  Functional Disability Index:  NDI   2/8/22    21     NDI 3/8/22  21    Richards Balance   4/1/22     ITEM DESCRIPTION SCORE (0-4)  1. Sitting to standing ____4____  2. Standing unsupported _____4___  3. Sitting unsupported _______4_  4. Standing to sitting ______4__  5. Transfers _4_______  6. Standing with eyes closed ____4____  7. Standing with feet together __4______  8. Reaching forward with outstretched arm __4______  9. Retrieving object from floor ____4____  10. Turning to look behind _4_______  11. Turning 360 degrees ___4_____  12. Placing alternate foot on stool ___4_____wobbly  13. Standing with one foot in front ___4_____  14. Standing on one foot ____2____  Total _54______         Pain Scale: 3/10 neck     SUBJECTIVE: Pt reports weakness in his arms at times with NT, also bilateral hands. Notes weakness in both legs R>L                          3/22/22 - States he had significant increased LB / LE pain after last appt ( 1st land therapy)  3/29: Pt reports balance a little better. Got in the pool yesterday and did the aquatic exercises without c/o.  4/22/22 -  Arms 8/10 to 9-10/10, numb, tingling, painful bilateral neck to fingertips                  Back intermittent  4-5/10                  Intermittent leg pain with occasional balance issues as well. OBJECTIVE:   Palpation: no specific TTP noted. Has mild pitting edema moreso on right than left.     Functional Mobility/Transfers: indep without UE    UE -  ROM  WFL  Strength  Grossly 4-/5 throughout     LE ROM -  WFL mario LE's, increased effort to initiate movement RLE  LE strength - grossly 4/5 left,  4-/5 right.     Balance -  Feet together - wfl ,  Modified tandem min sway. Tandem  R/L increased sway with R/L  - unable   3/8 - right in front tandem  30 sec. Left in front tandem 15 sec, inc sway  3/25 narrow ZO  wfl  -  R/L sec tandem 30 sec  SLS right 23 sec        Left  30 sec    4/22 assessment  Strength -  UE's  Grossly 4+/5 mario UE's    right 75#, left 85#                    LE's right hip flexion  4+/5, left ankle DF 4+/5, otherwise 5/5    Balance -  Difficulty with SLS today - unable to do without assist      Verbal review of nustep and aquatic exercises which help to relieve his pain. Land-based Visits Exercises/Activities:  Therapeutic Ex (83954)  Min: 10   Resistance/Repetitions Notes   Nustep seat 14                           (LE only) 5 min  Level 2    HSS 30 sec x 2    GSS 30 sec x 2                                                      HHA prn for balance   Walking gt, marching gt without LOB              Quadruped  opp  Extremity lift X 10 each   5 sec hold              1/2 kneeling On Right    Mini squat start in stance and go to 1/2 kneeling Ball rotation in hands R/L  Ball toss to wall   Same    ADD     GUARD Unsteady            Bridge with LE's on purple ball Arms crossed over chest  X 10             Other Therapeutic Activities:  Pt was educated on PT POC, Diagnosis, Prognosis, pathomechanics as well as frequency and duration of scheduling future physical therapy appointments. Time was also taken on this day to answer all patient questions and participation in PT. Reviewed appointment policy in detail with patient and patient verbalized understanding. Home Exercise Program:  Patient was instructed in the following for HEP:     . Patient verbalized/demonstrated understanding and was issued written handout. Charges:   Therapeutic Exercise and NMR EXR  [x] (68240) Provided verbal/tactile cueing for activities related to strengthening, flexibility, endurance, ROM for improvements in LE, proximal hip, and core control with self care, mobility, lifting, ambulation. [x] (22533) Provided verbal/tactile cueing for activities related to improving balance, coordination, kinesthetic sense, posture, motor skill, proprioception  to assist with LE, proximal hip, and core control in self care, mobility, lifting, ambulation and eccentric single leg control. NMR and Therapeutic Activities:    [x] (44205 or 57304) Provided verbal/tactile cueing for activities related to improving balance, coordination, kinesthetic sense, posture, motor skill, proprioception and motor activation to allow for proper function of core, proximal hip and LE with self care and ADLs and functional mobility.    [x] (99485) Gait Re-education- Provided training and instruction to the patient for proper LE, core and proximal hip recruitment and positioning and eccentric body weight control with ambulation re-education including up and down stairs     Home Exercise Program:    [x] (07607) Reviewed/Progressed HEP activities related to strengthening, flexibility, endurance, ROM of core, proximal hip and LE for functional self-care, mobility, lifting and ambulation/stair navigation   [] (12244)Reviewed/Progressed HEP activities related to improving balance, coordination, kinesthetic sense, posture, motor skill, proprioception of core, proximal hip and LE for self care, mobility, lifting, and ambulation/stair navigation        Land Timed Code Treatment Minutes: 25   Land Total Treatment Minutes: 25     [] EVAL (LOW) 60062 (typically 20 minutes face-to-face)  [] EVAL (MOD) 47944 (typically 30 minutes face-to-face)  [] EVAL (HIGH) 71473 (typically 45 minutes face-to-face)  [] RE-EVAL     [x] CF(62338) x  1  [] Dry needle 1 or 2 Muscles (96985)  [] NMR (93212) x                      Dry needle 3+ Muscles (73305)  [] Manual (32231) x     [] Ultrasound (66029) x  [x] TA (29322) x   1  [] Mech Traction (68540)  [] ES(attended) (34482)     [] ES (un) (91775):   [] Vasopump (64577) [] Ionto (20147)   [] Aquatic Ind (39917) x    [] Aquatic Group ([phone]) x0  [] Other:    Treatment/Activity Tolerance:  [x] Patient tolerated treatment well [] Patient limited by fatigue  [] Patient limited by pain  [] Patient limited by other medical complications  [] Other:     Prognosis: [] Good [] Fair  [] Poor     ASSESSMENT Pt with history of UE/LE weakness 2/2 cord compression. Will benefit from aquatic therapy   2/10: Pt completed exercises as instructed. Pt notes dec pain after aquatic therapy. Pt would benefit from skilled instruction for UE exer, cervical ROM, balance and posture awareness to dec pain, inc ROM and improve function. 2/15: Pt without c/o throughout treatment. Verbal cues needed to continue to look ahead / neutral neck posture as pt tends to look down into pool during exercises. Pt able to correct with cues. Pt would benefit from continued skilled instruction for posture and exercises to inc strength and posture awareness. 2/17: Pt tolerated exercises however pt notes LB pain continued. Pt demonstrating improved posture today. Pt will be benefit from continued skilled instruction to progress exercises for inc strength for ADL's and posture. 2/22: Pt tolerates exercises with proper technique would benefit from skilled instruction to inc strength and continue improved posture. 2/24: Pt demonstrates improved posture and exercise technique. Pt would benefit from additional skilled instruction to inc strength and functional ADL's.   3/3: Pt reports pain 0/10 after aquatics today. Re-eval next treatment. 3/8: Pt notes dec pain after aquatic therapy. 3/25 -  Demonstrates improved balance as evidence by increased times for tandem ,SLS  3/29: Pt demonstrates continued improve balance as seen with pt amb on mat and marching on mat, rocker board without HHA, and heel-toe gait without LOB.  Pt would benefit from continued advanced balance activities and strengthening for more challenging ADL's.   4/22 -  Pt has had difficulty the past three weeks with increased pain - To MD - further evaluations planned - Will Dc from PT at this t deandre and may resume when pain is under  Control      GOALS:  Patient stated goal: TO be able to move better and not have as much pain  [x]? Progressing: []? Met: []? Not Met: []? Adjusted     Therapist goals for Patient:   Short Term Goals: To be achieved in: 2 weeks  1. Independent in HEP and progression per patient tolerance, in order to prevent re-injury. [x]? Progressing: []? Met: []? Not Met: []? Adjusted  2. Patient will have a decrease in pain to facilitate improvement in movement, function, and ADLs as indicated by Functional Deficits. [x]? Progressing: []? Met: []? Not Met: []? Adjusted     Long Term Goals: To be achieved in: 12 weeks  1. Disability index score of 30% or less for the NDI to assist with reaching prior level of function. [x]? Progressing: []? Met: []? Not Met: []? Adjusted  2. Patient will demonstrate increased AROM to [facility] of cervical/thoracic spine to allow for proper joint functioning as indicated by patients Functional Deficits. [x]? Progressing: []? Met: []? Not Met: []? Adjusted  3. Patient will demonstrate an increase in postural awareness and control and activation of  Deep cervical stabilizers to allow for proper functional mobility as indicated by patients Functional Deficits. [x]? Progressing: []? Met: []? Not Met: []? Adjusted  4. Patient will return to 70% functional activities without increased symptoms or restriction. [x]? Progressing: []? Met: []? Not Met: []? Adjusted  5. To be able to walk better with better balance. [x]? Progressing: []? Met: []? Not Met: []?  Adjusted               Plan:   [] Continue per plan of care [] Alter current plan (see comments)  /[] Plan of care initiated [x] Hold pending MD visit [] Discharge    Plan for Next Session:  Continue to push high level balance activities    Electronically signed by:  Pratibha Garcia PT [id number]    /Note: If patient does not return for scheduled/recommended follow up visits, this note will serve as a discharge from care along with the most recent update on progress.

## 2022-04-26 ENCOUNTER — HOSPITAL ENCOUNTER (OUTPATIENT)
Dept: PHYSICAL THERAPY | Age: 42
Setting detail: THERAPIES SERIES
End: 2022-04-26
Payer: OTHER GOVERNMENT

## 2022-04-27 NOTE — FLOWSHEET NOTE
follow up with Physician:      Progress Report: []  Yes  [x]  No     Date Range for reporting period:  Beginnin2022  Ending:      Progress report due (10 Rx/or 30 days whichever is less): 3/8     Recertification due (POC duration/ or 90 days whichever is less):      Visit # POC/Insurance Allowable Auth Needed   14 30 []Yes   [x]No     Latex Allergy:  [x]NO      []YES  Preferred Language for Healthcare:   [x]English       []Other:    History of Present Condition   21     POSTERIOR CERVICAL LAMINIOPLASTY C4-5-6-7 ; ANTERIOR CERVICAL DISCECTOMY FUSION C4-5, C5-6 , C6-7 ASPIRATE BONE MARROW RIGHT ILIAC CREST AND C6 CORPECTOMY            MD note 22  The patient is now approximately six to seven months post anterior-posterior fusion. His sequential scans show marked improvement in the cervical stenosis, cord compression and swelling. The swelling in his cord is improving. His subarachnoid space is now circumferentially present.           SUBJECTIVE:     - pain from neck down to fingertips both sides,  Hands are numb all day long and tingling. Relevant Medical History:Additional Pertinent Hx: HTN, back surgery   Functional Disability Index:  NDI   22    21     NDI 3/8/22  21    Richards Balance   22     ITEM DESCRIPTION SCORE (0-4)  1. Sitting to standing ____4____  2. Standing unsupported _____4___  3. Sitting unsupported _______4_  4. Standing to sitting ______4__  5. Transfers _4_______  6. Standing with eyes closed ____4____  7. Standing with feet together __4______  8. Reaching forward with outstretched arm __4______  9. Retrieving object from floor ____4____  10. Turning to look behind _4_______  11. Turning 360 degrees ___4_____  12. Placing alternate foot on stool ___4_____wobbly  13. Standing with one foot in front ___4_____  14. Standing on one foot ____2____  Total _54______         Pain Scale: 3/10 neck     SUBJECTIVE: Pt reports weakness in his arms at times with NT, also bilateral hands. Notes weakness in both legs R>L                          3/22/22 - States he had significant increased LB / LE pain after last appt ( 1st land therapy)  3/29: Pt reports balance a little better. Got in the pool yesterday and did the aquatic exercises without c/o.  4/22/22 -  Arms 8/10 to 9-10/10, numb, tingling, painful bilateral neck to fingertips                  Back intermittent  4-5/10                  Intermittent leg pain with occasional balance issues as well. OBJECTIVE:     3/25 narrow ZO  wfl  -  R/L sec tandem 30 sec  SLS right 23 sec        Left  30 sec    4/22 assessment  Strength -  UE's  Grossly 4+/5 mario UE's    right 75#, left 85#                    LE's right hip flexion  4+/5, left ankle DF 4+/5, otherwise 5/5    Balance -  Difficulty with SLS today - unable to do without assist       ASSESSMENT4/22 -  Pt has had difficulty the past three weeks with increased pain - To MD - further evaluations planned - Will Dc from PT at this t deandre and may resume when pain is under  Control      GOALS:  Patient stated goal: TO be able to move better and not have as much pain  []? Progressing: []? Met: [x]? Not Met: []? Adjusted     Therapist goals for Patient:   Short Term Goals: To be achieved in: 2 weeks  1. Independent in HEP and progression per patient tolerance, in order to prevent re-injury. []? Progressing: [x]? Met: []? Not Met: []? Adjusted  2. Patient will have a decrease in pain to facilitate improvement in movement, function, and ADLs as indicated by Functional Deficits. []? Progressing: []? Met: [x]? Not Met: []? Adjusted     Long Term Goals: To be achieved in: 12 weeks  1. Disability index score of 30% or less for the NDI to assist with reaching prior level of function. [x]? Progressing: []? Met: [x]? Not Met: []? Adjusted  2.  Patient will demonstrate increased AROM to [facility] of cervical/thoracic spine to allow for proper joint functioning as indicated by patients Functional Deficits. [x]? Progressing: []? Met: [x]? Not Met: []? Adjusted  3. Patient will demonstrate an increase in postural awareness and control and activation of  Deep cervical stabilizers to allow for proper functional mobility as indicated by patients Functional Deficits. []? Progressing: []? Met: [x]? Not Met: []? Adjusted  4. Patient will return to 70% functional activities without increased symptoms or restriction. []? Progressing: []? Met: [x]? Not Met: []? Adjusted  5. To be able to walk better with better balance. []? Progressing: [x]? Met: []? Not Met: []? Adjusted               Plan:   [] Continue per plan of care [] Alter current plan (see comments)  /[] Plan of care initiated [x] Hold pending MD visit [] Discharge    Electronically signed by:  MONIKA Sykes    /Note: If patient does not return for scheduled/recommended follow up visits, this note will serve as a discharge from care along with the most recent update on progress.

## 2022-04-29 ENCOUNTER — APPOINTMENT (OUTPATIENT)
Dept: PHYSICAL THERAPY | Age: 42
End: 2022-04-29
Payer: OTHER GOVERNMENT

## 2022-10-22 ENCOUNTER — HOSPITAL ENCOUNTER (EMERGENCY)
Age: 42
Discharge: HOME | End: 2022-10-22
Attending: EMERGENCY MEDICINE
Payer: OTHER GOVERNMENT

## 2022-10-22 ENCOUNTER — APPOINTMENT (OUTPATIENT)
Dept: CT IMAGING | Age: 42
End: 2022-10-22
Payer: OTHER GOVERNMENT

## 2022-10-22 VITALS
HEART RATE: 96 BPM | BODY MASS INDEX: 32.98 KG/M2 | TEMPERATURE: 98 F | WEIGHT: 257 LBS | HEIGHT: 74 IN | DIASTOLIC BLOOD PRESSURE: 111 MMHG | SYSTOLIC BLOOD PRESSURE: 149 MMHG | RESPIRATION RATE: 16 BRPM | OXYGEN SATURATION: 95 %

## 2022-10-22 DIAGNOSIS — R42 LIGHTHEADEDNESS: Primary | ICD-10-CM

## 2022-10-22 DIAGNOSIS — I10 ESSENTIAL HYPERTENSION: ICD-10-CM

## 2022-10-22 DIAGNOSIS — E86.0 DEHYDRATION: ICD-10-CM

## 2022-10-22 LAB
ANION GAP SERPL CALCULATED.3IONS-SCNC: 9 MMOL/L (ref 3–16)
BACTERIA: NORMAL /HPF
BASOPHILS ABSOLUTE: 0.1 K/UL (ref 0–0.2)
BASOPHILS RELATIVE PERCENT: 1.5 %
BILIRUBIN URINE: NEGATIVE
BLOOD, URINE: NEGATIVE
BUN BLDV-MCNC: 11 MG/DL (ref 7–20)
CALCIUM SERPL-MCNC: 9.3 MG/DL (ref 8.3–10.6)
CHLORIDE BLD-SCNC: 96 MMOL/L (ref 99–110)
CLARITY: CLEAR
CO2: 26 MMOL/L (ref 21–32)
COLOR: YELLOW
CREAT SERPL-MCNC: 0.8 MG/DL (ref 0.9–1.3)
D DIMER: 1.23 UG/ML FEU (ref 0–0.6)
EOSINOPHILS ABSOLUTE: 0.3 K/UL (ref 0–0.6)
EOSINOPHILS RELATIVE PERCENT: 5.1 %
EPITHELIAL CELLS, UA: 0 /HPF (ref 0–5)
GFR SERPL CREATININE-BSD FRML MDRD: >60 ML/MIN/{1.73_M2}
GLUCOSE BLD-MCNC: 89 MG/DL (ref 70–99)
GLUCOSE URINE: NEGATIVE MG/DL
HCT VFR BLD CALC: 38.7 % (ref 40.5–52.5)
HEMOGLOBIN: 12.8 G/DL (ref 13.5–17.5)
HYALINE CASTS: 0 /LPF (ref 0–8)
KETONES, URINE: NEGATIVE MG/DL
LEUKOCYTE ESTERASE, URINE: NEGATIVE
LYMPHOCYTES ABSOLUTE: 1.6 K/UL (ref 1–5.1)
LYMPHOCYTES RELATIVE PERCENT: 28.3 %
MCH RBC QN AUTO: 26 PG (ref 26–34)
MCHC RBC AUTO-ENTMCNC: 33.2 G/DL (ref 31–36)
MCV RBC AUTO: 78.2 FL (ref 80–100)
MICROSCOPIC EXAMINATION: NORMAL
MONOCYTES ABSOLUTE: 0.8 K/UL (ref 0–1.3)
MONOCYTES RELATIVE PERCENT: 14.5 %
NEUTROPHILS ABSOLUTE: 2.9 K/UL (ref 1.7–7.7)
NEUTROPHILS RELATIVE PERCENT: 50.6 %
NITRITE, URINE: NEGATIVE
PDW BLD-RTO: 15.3 % (ref 12.4–15.4)
PH UA: 8 (ref 5–8)
PLATELET # BLD: 273 K/UL (ref 135–450)
PMV BLD AUTO: 8.4 FL (ref 5–10.5)
POTASSIUM REFLEX MAGNESIUM: 4.1 MMOL/L (ref 3.5–5.1)
PROTEIN UA: NEGATIVE MG/DL
RBC # BLD: 4.95 M/UL (ref 4.2–5.9)
RBC UA: 0 /HPF (ref 0–4)
SODIUM BLD-SCNC: 131 MMOL/L (ref 136–145)
SPECIFIC GRAVITY UA: 1.02 (ref 1–1.03)
TROPONIN: <0.01 NG/ML
URINE TYPE: NORMAL
UROBILINOGEN, URINE: 0.2 E.U./DL
WBC # BLD: 5.7 K/UL (ref 4–11)
WBC UA: 0 /HPF (ref 0–5)

## 2022-10-22 PROCEDURE — 93005 ELECTROCARDIOGRAM TRACING: CPT | Performed by: PHYSICIAN ASSISTANT

## 2022-10-22 PROCEDURE — 36415 COLL VENOUS BLD VENIPUNCTURE: CPT

## 2022-10-22 PROCEDURE — 85379 FIBRIN DEGRADATION QUANT: CPT

## 2022-10-22 PROCEDURE — 84484 ASSAY OF TROPONIN QUANT: CPT

## 2022-10-22 PROCEDURE — 85025 COMPLETE CBC W/AUTO DIFF WBC: CPT

## 2022-10-22 PROCEDURE — 96360 HYDRATION IV INFUSION INIT: CPT

## 2022-10-22 PROCEDURE — 2580000003 HC RX 258: Performed by: PHYSICIAN ASSISTANT

## 2022-10-22 PROCEDURE — 81001 URINALYSIS AUTO W/SCOPE: CPT

## 2022-10-22 PROCEDURE — 99285 EMERGENCY DEPT VISIT HI MDM: CPT

## 2022-10-22 PROCEDURE — 96361 HYDRATE IV INFUSION ADD-ON: CPT

## 2022-10-22 PROCEDURE — 71260 CT THORAX DX C+: CPT | Performed by: PHYSICIAN ASSISTANT

## 2022-10-22 PROCEDURE — 6360000004 HC RX CONTRAST MEDICATION: Performed by: PHYSICIAN ASSISTANT

## 2022-10-22 PROCEDURE — 80048 BASIC METABOLIC PNL TOTAL CA: CPT

## 2022-10-22 RX ORDER — 0.9 % SODIUM CHLORIDE 0.9 %
1000 INTRAVENOUS SOLUTION INTRAVENOUS ONCE
Status: COMPLETED | OUTPATIENT
Start: 2022-10-22 | End: 2022-10-22

## 2022-10-22 RX ADMIN — IOPAMIDOL 75 ML: 755 INJECTION, SOLUTION INTRAVENOUS at 18:51

## 2022-10-22 RX ADMIN — SODIUM CHLORIDE 1000 ML: 9 INJECTION, SOLUTION INTRAVENOUS at 17:58

## 2022-10-22 ASSESSMENT — PAIN - FUNCTIONAL ASSESSMENT
PAIN_FUNCTIONAL_ASSESSMENT: NONE - DENIES PAIN
PAIN_FUNCTIONAL_ASSESSMENT: NONE - DENIES PAIN

## 2022-10-23 LAB
EKG ATRIAL RATE: 82 BPM
EKG DIAGNOSIS: NORMAL
EKG P AXIS: 57 DEGREES
EKG P-R INTERVAL: 188 MS
EKG Q-T INTERVAL: 408 MS
EKG QRS DURATION: 90 MS
EKG QTC CALCULATION (BAZETT): 476 MS
EKG R AXIS: -29 DEGREES
EKG T AXIS: 22 DEGREES
EKG VENTRICULAR RATE: 82 BPM

## 2022-10-23 PROCEDURE — 93010 ELECTROCARDIOGRAM REPORT: CPT | Performed by: INTERNAL MEDICINE

## 2022-10-23 NOTE — ED PROVIDER NOTES
[address] [facility]        Pt Name: Jannette Kim  MRN: [id number]  [place] 1980  Date of evaluation: 10/22/2022  Provider: Shalonda Bejarano PA-C  PCP: [facility]  Note Started: 12:21 AM EDT       I have seen and evaluated this patient with my supervising physician Ruiz Narayan MD.      Triage CHIEF COMPLAINT       Chief Complaint   Patient presents with    Dizziness     Pt states he has been dizzy for the past week     Hypertension     Pt states his bp has been high because when ever he takes the medicine it makes him dizzy  last took meds was yesterday          HISTORY OF PRESENT ILLNESS   (Location/Symptom, Timing/Onset, Context/Setting, Quality, Duration, Modifying Factors, Severity)  Note limiting factors. Chief Complaint: lightheadedness    Jannette Kim is a 38 y.o. male who presents to the ED with complaint of lightheadedness over the past week. He was car shopping earlier today in the 70 degree heat in the sun and felt lightheaded and hot. He had to sit down as he felt he was going to pass out. He did not actually have LOC however. He states that over the past several weeks he has been having some lightheadedness but contributes this to his BP medication being strong, so he stopped taking consistently over the past week. He denies any focal weakness or numbness, change in vision, chest pain, shortness of breath, fever, chills or any swelling in legs. Nursing Notes were all reviewed and agreed with or any disagreements were addressed in the HPI. REVIEW OF SYSTEMS    (2-9 systems for level 4, 10 or more for level 5)     Review of Systems   Constitutional:  Negative for chills and fever. HENT:  Negative for ear pain and sore throat. Eyes:  Negative for pain and redness. Respiratory:  Negative for cough and shortness of breath. Cardiovascular:  Negative for chest pain and leg swelling.    Gastrointestinal: Negative for abdominal pain, constipation, diarrhea, nausea and vomiting. Genitourinary:  Negative for dysuria and hematuria. Musculoskeletal:  Negative for back pain and neck pain. Skin:  Negative for rash and wound. Neurological:  Positive for light-headedness. Negative for seizures, syncope, facial asymmetry, weakness, numbness and headaches. PAST MEDICAL HISTORY     Past Medical History:   Diagnosis Date    Hypertension        SURGICAL HISTORY     Past Surgical History:   Procedure Laterality Date    BACK SURGERY  11/2014 & 8/2015       CURRENTMEDICATIONS       Discharge Medication List as of 10/22/2022  9:22 PM        CONTINUE these medications which have NOT CHANGED    Details   naproxen (NAPROSYN) 500 MG tablet Take 1 tablet by mouth 2 times daily, Disp-60 tablet, R-0Print      lidocaine (LIDODERM) 5 % Place 1 patch onto the skin daily 12 hours on, 12 hours off., Disp-10 patch, R-0Print      amLODIPine (NORVASC) 10 MG tablet Take 10 mg by mouthHistorical Med      triamterene-hydrochlorothiazide (MAXZIDE-25) 37.5-25 MG per tablet Take 1 tablet by mouth daily      furosemide (LASIX) 20 MG tablet Take 10 mg by mouth daily             ALLERGIES     Patient has no known allergies. FAMILYHISTORY     History reviewed. No pertinent family history.      SOCIAL HISTORY       Social History     Socioeconomic History    Marital status:      Spouse name: None    Number of children: None    Years of education: None    Highest education level: None   Tobacco Use    Smoking status: Never    Smokeless tobacco: Never   Substance and Sexual Activity    Alcohol use: Yes     Comment: Occasional    Drug use: Not Currently     Frequency: 14.0 times per week     Types: Marijuana (Weed)     Comment: quit 4 days ago    Sexual activity: Yes     Partners: Female       SCREENINGS             PHYSICAL EXAM    (up to 7 for level 4, 8 or more for level 5)     ED Triage Vitals   BP Temp Temp Source Heart Rate Resp SpO2 Height Weight   10/22/22 1616 10/22/22 1618 10/22/22 1618 10/22/22 1616 10/22/22 1616 10/22/22 1616 10/22/22 1616 10/22/22 1616   (!) 159/116 98 °F (36.7 °C) Oral 89 20 98 % 6' 2\" (1.88 m) 257 lb (116.6 kg)       Physical Exam  Constitutional:       General: He is not in acute distress. Appearance: Normal appearance. He is not ill-appearing, toxic-appearing or diaphoretic. HENT:      Head: Normocephalic and atraumatic. Right Ear: Tympanic membrane and external ear normal.      Left Ear: Tympanic membrane and external ear normal.      Nose: Nose normal.      Mouth/Throat:      Mouth: Mucous membranes are dry. Pharynx: No oropharyngeal exudate. Eyes:      General: No visual field deficit. Right eye: No discharge. Left eye: No discharge. Cardiovascular:      Rate and Rhythm: Normal rate and regular rhythm. Pulses: Normal pulses. Heart sounds: Normal heart sounds. No murmur heard. No gallop. Pulmonary:      Effort: Pulmonary effort is normal. No respiratory distress. Breath sounds: Normal breath sounds. No stridor. No wheezing, rhonchi or rales. Musculoskeletal:         General: Normal range of motion. Cervical back: Normal range of motion. Skin:     General: Skin is warm and dry. Neurological:      General: No focal deficit present. Mental Status: He is alert and oriented to person, place, and time. Cranial Nerves: No cranial nerve deficit, dysarthria or facial asymmetry. Sensory: Sensation is intact. No sensory deficit. Motor: No weakness or pronator drift. Coordination: Heel to Shin Test normal.      Gait: Gait is intact.  Gait normal.      Comments: No horizontal or vertical nystagmus  Negative test of skew  FAROM and NABILA in all 4 extremities  Normal gait     Psychiatric:         Mood and Affect: Mood normal.         Behavior: Behavior normal.       DIAGNOSTIC RESULTS   LABS:    Labs Reviewed   CBC WITH AUTO DIFFERENTIAL - Abnormal; Notable for the following components:       Result Value    Hemoglobin 12.8 (*)     Hematocrit 38.7 (*)     MCV 78.2 (*)     All other components within normal limits   BASIC METABOLIC PANEL W/ REFLEX TO MG FOR LOW K - Abnormal; Notable for the following components:    Sodium 131 (*)     Chloride 96 (*)     Creatinine 0.8 (*)     All other components within normal limits   D-DIMER, QUANTITATIVE - Abnormal; Notable for the following components:    D-Dimer, Quant 1.23 (*)     All other components within normal limits   TROPONIN   URINALYSIS WITH MICROSCOPIC       When ordered, only abnormal lab results are displayed. All other labs were within normal range or not returned as of this dictation. EKG: When ordered, EKG's are interpreted by the Emergency Department Physician in the absence of a cardiologist.  Please see their note for interpretation of EKG. RADIOLOGY:   Non-plain film images such as CT, Ultrasound and MRI are read by the radiologist. Plain radiographic images are visualized andpreliminarily interpreted by the  ED Provider with the below findings:        Interpretation perthe Radiologist below, if available at the time of this note:    CT CHEST PULMONARY EMBOLISM W CONTRAST   Final Result   Multiple patchy bilateral pleural base masses with the largest measuring 5 cm   in the anterior aspect of the left upper lobe. Celestia Jeffy 1.6 cm lobulated mass in the   right lower lobe. There is also mediastinal and hilar lymphadenopathy and   the findings are suspicious for malignancy. Recommend biopsy      No evidence of pulmonary embolism      RECOMMENDATIONS:   Based on MIPS measure 405, incidental abd lesions in this patient population   do not warrant F/U W/O a documented medical reason.            CT CHEST PULMONARY EMBOLISM W CONTRAST    Result Date: 10/22/2022  EXAMINATION: CTA OF THE CHEST 10/22/2022 3:40 pm TECHNIQUE: CTA of the chest was performed after the administration of intravenous contrast. Multiplanar reformatted images are provided for review. MIP images are provided for review. Automated exposure control, iterative reconstruction, and/or weight based adjustment of the mA/kV was utilized to reduce the radiation dose to as low as reasonably achievable. COMPARISON: None. HISTORY: ORDERING SYSTEM PROVIDED HISTORY: elevated d-dimner, hx of PE TECHNOLOGIST PROVIDED HISTORY: Reason for exam:->elevated d-dimner, hx of PE Decision Support Exception - unselect if not a suspected or confirmed emergency medical condition->Emergency Medical Condition (MA) Reason for Exam: elevated d-dimer, hx PE FINDINGS: Pulmonary Arteries: Pulmonary arteries are adequately opacified for evaluation. No evidence of intraluminal filling defect to suggest pulmonary embolism. Main pulmonary artery is normal in caliber. Mediastinum: Fairly extensive abnormal appearing lymph nodes in the mediastinum, subcarinal region and nasima. Lungs/pleura: Multiple patchy bilateral pleural base masslike infiltrates with the largest measuring 5 cm in the anterior aspect of the left upper lobe. Varghese Patella 1.6 cm lobulated mass in the right lower lobe. No pleural effusions are identified. Cardiomediastinal Structures: No evidence of thoracic aortic aneurysm or dissection . Cardiac chambers are normal Upper Abdomen: Probable partially visualize right renal cyst. Soft Tissues/Bones: No acute osseous abnormalities. Multiple patchy bilateral pleural base masses with the largest measuring 5 cm in the anterior aspect of the left upper lobe. Varghese Patella 1.6 cm lobulated mass in the right lower lobe. There is also mediastinal and hilar lymphadenopathy and the findings are suspicious for malignancy. Recommend biopsy No evidence of pulmonary embolism RECOMMENDATIONS: Based on MIPS measure 405, incidental abd lesions in this patient population do not warrant F/U W/O a documented medical reason.          PROCEDURES   Unless otherwise noted below, none Procedures    CRITICAL CARE TIME   N/A    CONSULTS:  None      EMERGENCY DEPARTMENT COURSE and DIFFERENTIAL DIAGNOSIS/MDM:   Vitals:    Vitals:    10/22/22 1834 10/22/22 2010 10/22/22 2011 10/22/22 2014   BP: (!) 153/96 (!) 186/107 (!) 154/110 (!) 149/111   Pulse: 86 82 80 96   Resp: 16      Temp:       TempSrc:       SpO2: 98% 97% 95%    Weight:       Height:           Patient was given thefollowing medications:  Medications   0.9 % sodium chloride bolus (0 mLs IntraVENous Stopped 10/22/22 2013)   iopamidol (ISOVUE-370) 76 % injection 75 mL (75 mLs IntraVENous Given 10/22/22 1851)         Is this patient to be included in the SEP-1 Core Measure due to severe sepsis or septic shock? No   Exclusion criteria - the patient is NOT to be included for SEP-1 Core Measure due to: Infection is not suspected    This is a 38y.o. year old male with PMH of sarcoidosis, DM2, HTN who presents to the ED with complaint of lightheadedness that has been present for the past week. Worsened today when he was out in the sun looking for cars. Vitals upon arrival show hypertensive at 159/116, this improved to 149/111--otherwise within normal limits. Physical exam shows as above. Blood work was performed:  -elevated d-dimer  Urinalysis: No indication of infection  Imaging was ordered and performed and reviewed and read by radiologist as above showing multiple patchy bilateral pleural-based masses with the largest measuring 5 cm in the anterior aspect of the left upper lobe, 1.6 cm lobulated mass in the right lower lobe there is also mediastinal and hilar lymphadenopathy there are findings suspicious for malignancy. I did discuss this with patient. He is being followed by a pulmonologist.  Discussed the importance of keeping follow-up with them as this may be slightly worse than what it was when he had a CT back in March. Patient and patient's wife understood.    Differential diagnosis includes dehydration, PE, infection, other.  Medications given: fluids  Symptoms significanlty improved. Patient states he is ready to go home. He was given a copy of his CT results. Discussed returning to ED if symptoms occur again or new concerning symptoms present. He was in agreement with plan. Will follow up with PCP vs pulmonologist over next 3-5 days for further evaluaiton. I am the Primary Clinician of Record. FINAL IMPRESSION      1. Lightheadedness    2. Dehydration    3.  Essential hypertension          DISPOSITION/PLAN   DISPOSITION Decision To Discharge 10/22/2022 09:14:55 PM      PATIENT REFERREDTO:  [facility]    Schedule an appointment as soon as possible for a visit in 1 day  for reevaluation    [facility] Emergency Department  [address] [facility] [phone]  Go to   As needed, If symptoms worsen      DISCHARGE MEDICATIONS:  Discharge Medication List as of 10/22/2022  9:22 PM          DISCONTINUED MEDICATIONS:  Discharge Medication List as of 10/22/2022  9:22 PM                 (Please note that portions ofthis note were completed with a voice recognition program.  Efforts were made to edit the dictations but occasionally words are mis-transcribed.)    Kailey Smith PA-C (electronically signed)             Kailey Smith PA-C  11/13/22 [id number]

## 2022-10-23 NOTE — ED PROVIDER NOTES
Attending Note:    The patient was seen and examined by the mid-level provider. I also completed a face-to-face examination. HPI: Darell Cheung is a 38 y.o. male who presents to the emergency department with a complaint of lightheadedness that occurred today prior to arrival.  He states that he had been walking around a car lot looking for a car in the direct sun. Temperature was 77 degrees outside. He states that he got hot and felt lightheaded and felt like he was going to pass out. He denies any vision change, vision loss, vertigo, tinnitus, hearing loss, facial droop, difficulty speaking, slurred speech, focal weakness or numbness. He denies any actual loss of consciousness. He did not fall or hit his head. He denies any associated chest pain heaviness pressure tightness or shortness of breath. No diaphoresis. He denies any leg or calf pain. He does report that he has been having some intermittent lightheadedness over the last several weeks. He states that over the last several months he has lost approximately 35 pounds. He believes that his blood pressure medicine may be \"too strong\". As a result of this he stopped taking his blood pressure medicine approximately 1 week ago. He states that he did take it on Wednesday and took it yesterday evening but has not taken any blood pressure medicine today. Medical history is significant for hypertension, sarcoidosis. He states that he had a pulmonary embolus following a cervical spine surgery approximately 1-1/2 years ago. He states that he was taken off anticoagulants after 1 year and also has a vena cava filter. No subsequent thromboembolic disease. He does not smoke. At the time of my examination the patient is asymptomatic. He denies any physical complaints. He has received IV fluids and states that he feels much better now. He suspects that he may have been dehydrated.     Physical Exam:     Constitutional: No apparent distress. Head: No visible evidence of trauma. Normocephalic. Eyes: Pupils equal and reactive. No photophobia. Conjunctiva normal.  No nystagmus. No visual field deficits. Sclera white. Conjunctive a pink. HENT: Oral mucosa moist.  Airway patent. Pharynx without erythema. Neck:  Soft and supple. Nontender. No point or axial tenderness. Full range of motion without discomfort. Heart:  Regular rate and rhythm. No murmur. Lungs:  Clear to auscultation. No wheezes, rales, or ronchi. No conversational dyspnea or accessory muscle use. Abdomen:  Soft, non-distended. Nontender. No guarding rigidity or rebound. Musculoskeletal: Extremities non-tender with full range of motion. Radial and dorsalis pedis pulses were equal bilaterally. No calf tenderness erythema or edema. Neurological: Alert and oriented x3. GCS 15. Cranial nerves II through XII were intact. No facial droop. No dysarthria. No aphasia. No pronator drift. No acute focal motor or sensory deficits. Negative finger-to-nose. Negative heel-to-shin. Deep tendon reflexes were 2/4 in the upper and lower extremities bilaterally. Gait steady. No visual field deficits. Negative test of skew. No nystagmus. Gait steady. Skin: Skin is warm and dry. No rash. Psychiatric: Normal mood and affect. Behavior is normal.     DIAGNOSTIC RESULTS     EKG: All EKG's are interpreted by the Emergency Department Physician who either signs or Co-signs this chart in the absence of a cardiologist.    Normal sinus rhythm. Rate 82. VA interval 188 ms. QRS duration 90 ms. QTc 477 ms. R axis -29 degrees. No ST elevation. Normal EKG.     RADIOLOGY:   Non-plain film images such as CT, Ultrasound and MRI are read by the radiologist. Plain radiographic images are visualized and preliminarily interpreted by the emergency physician with the below findings:        Interpretation per the Radiologist below, if available at the time of this note:    CT CHEST PULMONARY EMBOLISM W CONTRAST   Final Result   Multiple patchy bilateral pleural base masses with the largest measuring 5 cm   in the anterior aspect of the left upper lobe. Bridgette Leaf 1.6 cm lobulated mass in the   right lower lobe. There is also mediastinal and hilar lymphadenopathy and   the findings are suspicious for malignancy. Recommend biopsy      No evidence of pulmonary embolism      RECOMMENDATIONS:   Based on MIPS measure 405, incidental abd lesions in this patient population   do not warrant F/U W/O a documented medical reason. ED BEDSIDE ULTRASOUND:   Performed by ED Physician - none    LABS:  Labs Reviewed   CBC WITH AUTO DIFFERENTIAL - Abnormal; Notable for the following components:       Result Value    Hemoglobin 12.8 (*)     Hematocrit 38.7 (*)     MCV 78.2 (*)     All other components within normal limits   BASIC METABOLIC PANEL W/ REFLEX TO MG FOR LOW K - Abnormal; Notable for the following components:    Sodium 131 (*)     Chloride 96 (*)     Creatinine 0.8 (*)     All other components within normal limits   D-DIMER, QUANTITATIVE - Abnormal; Notable for the following components:    D-Dimer, Quant 1.23 (*)     All other components within normal limits   TROPONIN   URINALYSIS WITH MICROSCOPIC       All other labs were within normal range or not returned as of this dictation. EMERGENCY DEPARTMENT COURSE and DIFFERENTIAL DIAGNOSIS/MDM:   Vitals:    Vitals:    10/22/22 1834 10/22/22 2010 10/22/22 2011 10/22/22 2014   BP: (!) 153/96 (!) 186/107 (!) 154/110 (!) 149/111   Pulse: 86 82 80 96   Resp: 16      Temp:       TempSrc:       SpO2: 98% 97% 95%    Weight:       Height:               MDM        I personally saw and performed a substantive portion of the visit including all aspects of the medical decision making. The patient presents with an episode of lightheadedness that occurred prior to arrival while walking around a car lot. He is neurologically intact.   No evidence of ischemic stroke. NIH stroke scale is 0. No evidence of vertigo or nystagmus. He is currently asymptomatic. He does feel better after receiving some IV fluids. He is mildly hypertensive at the time of my exam.  Blood pressure is 150/100. I do not suspect that hypertension is the cause of his lightheadedness. He was actually concerned that his blood pressure may be too strong for him and has recently stopped taking his blood pressure medicine. Given his weight loss this certainly is a possibility. Today there was no associated palpitations or syncope. He is not orthostatic. Orthostatic vital signs are unremarkable. EKG is unremarkable. Normal sinus rhythm. Normal EKG. D-dimer was elevated at 1.23. Hemoglobin 12.8. White blood cell count is normal.  Creatinine 0.8. Troponin 0.01.  CT chest PE protocol was obtained given his history of pulmonary embolus. CT chest PE protocol reveals multiple patchy bilateral masses which are unchanged. There is no evidence of pulmonary embolus. Patient has had previous work-up and biopsy. He has an established diagnosis of sarcoidosis. CT was compared to prior study from March 25, 2022 at [facility].  He also underwent pet imaging at [facility] on April 15, 2022 and CT-guided biopsy on May 4, 2022. The cause of his lightheadedness today is unclear. He is neurologically intact. He has a normal neurological exam and is not orthostatic. He is now asymptomatic. He feels good enough to go home. I advised him to follow-up with his primary care physician 1 to 2 days for reexamination and drink plenty of fluids. If his condition worsens or new symptoms develop, he was advised to return immediately to the emergency department. The cause of his    CRITICAL CARE TIME     I personally saw the patient and independently provided 0 minutes of non-concurrent critical care out of the total shared critical care time provided.  This excludes separately reportable procedures. There was a high probability of clinically significant/life threatening deterioration in the patient's condition which required my urgent intervention. CONSULTS:  None    PROCEDURES:  Unless otherwise noted below, none     Procedures        FINAL IMPRESSION      1. Lightheadedness    2. Dehydration          DISPOSITION/PLAN   DISPOSITION Decision To Discharge 10/22/2022 09:14:55 PM      PATIENT REFERRED TO:  [facility]inic [facility]    Schedule an appointment as soon as possible for a visit in 1 day  for reevaluation    [facility] Emergency Department  [address] [facility] [address] [phone]  [phone]  Go to   As needed, If symptoms worsen    DISCHARGE MEDICATIONS:  Discharge Medication List as of 10/22/2022  9:22 PM        Controlled Substances Monitoring:     No flowsheet data found. (Please note that portions of this note were completed with a voice recognition program.  Efforts were made to edit the dictations but occasionally words are mis-transcribed. )    1859 Dexter Franklin DO (electronically signed)  Attending Emergency Physician       Liane Robison DO  10/22/22 [id number]

## 2022-10-23 NOTE — ED NOTES
Per BREANN Montes, troponin needed to be sent but stated that they already had a green top dowqn there for the BMP. It was resulted AFTER the troponin was added on. I called lab and lab stated they didn't have it but when I asked about the BMP being resulted [facility] from lab stated that he found it and will run it.       21 Tamera Willson  10/22/22 2006

## 2022-10-23 NOTE — DISCHARGE INSTRUCTIONS
Get in to see your PCP as soon as possible. Restart one of your BP (losartan-HCTZ) medications, make a journal over the next 30 days or until follow up with PCP  If your symptoms persist or re-occur, you should follow up with PCP for ECHO and stress test.   Return if symptoms worsen. CT findings print out was given and discussed today.

## 2022-11-13 ASSESSMENT — ENCOUNTER SYMPTOMS
BACK PAIN: 0
EYE REDNESS: 0
EYE PAIN: 0
CONSTIPATION: 0
SHORTNESS OF BREATH: 0
ABDOMINAL PAIN: 0
COUGH: 0
VOMITING: 0
DIARRHEA: 0
SORE THROAT: 0
NAUSEA: 0